# Patient Record
Sex: MALE | Race: ASIAN | Employment: UNEMPLOYED | URBAN - METROPOLITAN AREA
[De-identification: names, ages, dates, MRNs, and addresses within clinical notes are randomized per-mention and may not be internally consistent; named-entity substitution may affect disease eponyms.]

---

## 2017-01-20 RX ORDER — GLIPIZIDE 5 MG/1
TABLET ORAL
Qty: 90 TABLET | Refills: 2 | Status: SHIPPED | OUTPATIENT
Start: 2017-01-20 | End: 2017-05-03

## 2017-02-21 RX ORDER — ASPIRIN 81 MG
TABLET, DELAYED RELEASE (ENTERIC COATED) ORAL
Qty: 90 TABLET | Refills: 3 | Status: SHIPPED | OUTPATIENT
Start: 2017-02-21 | End: 2018-03-12

## 2017-04-02 DIAGNOSIS — E11.65 TYPE 2 DIABETES MELLITUS WITH HYPERGLYCEMIA, WITHOUT LONG-TERM CURRENT USE OF INSULIN (HCC): Chronic | ICD-10-CM

## 2017-04-02 DIAGNOSIS — Z00.00 BLOOD TESTS FOR ROUTINE GENERAL PHYSICAL EXAMINATION: Primary | ICD-10-CM

## 2017-04-02 DIAGNOSIS — E78.00 HYPERCHOLESTEREMIA: Chronic | ICD-10-CM

## 2017-04-02 DIAGNOSIS — I10 ESSENTIAL HYPERTENSION: Chronic | ICD-10-CM

## 2017-04-03 NOTE — TELEPHONE ENCOUNTER
Pt needs metformin and Avivia plus strips    Future Appointments  Date Time Provider Ion Salas   4/11/2017 9:15 AM Anibal Brooks MD [facility] [place]   4/14/2017 10:15 AM Anibal Brooks MD [facility] [place]     Call pt [phone] can leave d

## 2017-04-06 ENCOUNTER — TELEPHONE (OUTPATIENT)
Dept: INTERNAL MEDICINE CLINIC | Facility: CLINIC | Age: 47
End: 2017-04-06

## 2017-04-11 ENCOUNTER — LAB ENCOUNTER (OUTPATIENT)
Dept: LAB | Age: 47
End: 2017-04-11
Attending: INTERNAL MEDICINE
Payer: COMMERCIAL

## 2017-04-11 DIAGNOSIS — Z00.00 BLOOD TESTS FOR ROUTINE GENERAL PHYSICAL EXAMINATION: ICD-10-CM

## 2017-04-11 DIAGNOSIS — E78.00 HYPERCHOLESTEREMIA: Chronic | ICD-10-CM

## 2017-04-11 DIAGNOSIS — E11.65 TYPE 2 DIABETES MELLITUS WITH HYPERGLYCEMIA, WITHOUT LONG-TERM CURRENT USE OF INSULIN (HCC): Chronic | ICD-10-CM

## 2017-04-11 DIAGNOSIS — I10 ESSENTIAL HYPERTENSION: ICD-10-CM

## 2017-04-11 PROCEDURE — 36415 COLL VENOUS BLD VENIPUNCTURE: CPT

## 2017-04-11 PROCEDURE — 83036 HEMOGLOBIN GLYCOSYLATED A1C: CPT

## 2017-04-11 PROCEDURE — 82570 ASSAY OF URINE CREATININE: CPT

## 2017-04-11 PROCEDURE — 82043 UR ALBUMIN QUANTITATIVE: CPT

## 2017-04-11 PROCEDURE — 85025 COMPLETE CBC W/AUTO DIFF WBC: CPT

## 2017-04-11 PROCEDURE — 81001 URINALYSIS AUTO W/SCOPE: CPT

## 2017-04-11 PROCEDURE — 80061 LIPID PANEL: CPT

## 2017-04-11 PROCEDURE — 80053 COMPREHEN METABOLIC PANEL: CPT

## 2017-04-11 PROCEDURE — 84443 ASSAY THYROID STIM HORMONE: CPT

## 2017-04-11 PROCEDURE — 84436 ASSAY OF TOTAL THYROXINE: CPT

## 2017-04-14 ENCOUNTER — OFFICE VISIT (OUTPATIENT)
Dept: INTERNAL MEDICINE CLINIC | Facility: CLINIC | Age: 47
End: 2017-04-14

## 2017-04-14 VITALS
OXYGEN SATURATION: 97 % | HEIGHT: 70 IN | RESPIRATION RATE: 22 BRPM | TEMPERATURE: 99 F | WEIGHT: 247.75 LBS | HEART RATE: 113 BPM | DIASTOLIC BLOOD PRESSURE: 100 MMHG | SYSTOLIC BLOOD PRESSURE: 158 MMHG | BODY MASS INDEX: 35.47 KG/M2

## 2017-04-14 DIAGNOSIS — I10 ESSENTIAL HYPERTENSION: Chronic | ICD-10-CM

## 2017-04-14 DIAGNOSIS — E11.65 TYPE 2 DIABETES MELLITUS WITH HYPERGLYCEMIA, WITHOUT LONG-TERM CURRENT USE OF INSULIN (HCC): Primary | Chronic | ICD-10-CM

## 2017-04-14 PROCEDURE — 99214 OFFICE O/P EST MOD 30 MIN: CPT | Performed by: INTERNAL MEDICINE

## 2017-04-14 RX ORDER — LOSARTAN POTASSIUM 50 MG/1
TABLET ORAL
Refills: 2 | COMMUNITY
Start: 2017-02-21 | End: 2017-04-14

## 2017-04-14 RX ORDER — LOSARTAN POTASSIUM 100 MG/1
100 TABLET ORAL DAILY
Qty: 90 TABLET | Refills: 0 | Status: SHIPPED | OUTPATIENT
Start: 2017-04-14 | End: 2017-09-01

## 2017-04-14 RX ORDER — LATANOPROST 50 UG/ML
SOLUTION/ DROPS OPHTHALMIC
Refills: 1 | COMMUNITY
Start: 2017-03-07 | End: 2018-06-06

## 2017-04-14 NOTE — PROGRESS NOTES
Madeleine Kunz  1970 is a 48year old male. Patient presents with:   Follow - Up: DM     Patient does confess to noncompliance with diet exercise  HPI:   DIABETES MELLITUS:   The diet that the patient has been following is none   The frequency of th Weight loss no. Ophthalmologic:   Change in vision none. Diminished vision no. Double vision no. Vision loss no. Eye check done   Endocrine:   Excessive sweating no. Excessive thirst no. Excessive urination no. Cardiovascular:   Chest pain no.  [employer] Food is an important tool that you can use to control diabetes and stay healthy. Eating well-balanced meals in the correct amounts will help you control your blood glucose levels and prevent low blood sugar reactions.  It will also help you reduce the healt · Avoid added salt. It can contribute to high blood pressure, which can cause heart disease. People with diabetes already have a risk of high blood pressure and heart disease. · Stay at a healthy weight.  If you need to lose weight, cut down on your portio · Select foods from the 6 food groups below. Your dietitian will help you find food choices within each group.  He or she will also show you serving sizes and how many servings you can have at each meal.  ¨ Grains, beans, and starchy vegetables  ¨ Vegetable © 1179-1717 [address], [address] [place]. All rights reserved. This information is not intended as a substitute for professional medical care. Always follow your healthcare professional's instructions.       Patient t

## 2017-04-14 NOTE — PATIENT INSTRUCTIONS
Diet: Diabetes  Food is an important tool that you can use to control diabetes and stay healthy. Eating well-balanced meals in the correct amounts will help you control your blood glucose levels and prevent low blood sugar reactions.  It will also help yo · Avoid added salt. It can contribute to high blood pressure, which can cause heart disease. People with diabetes already have a risk of high blood pressure and heart disease. · Stay at a healthy weight.  If you need to lose weight, cut down on your portio · Select foods from the 6 food groups below. Your dietitian will help you find food choices within each group.  He or she will also show you serving sizes and how many servings you can have at each meal.  ¨ Grains, beans, and starchy vegetables  ¨ Vegetable © 1823-7133 [address], [address] [place]. All rights reserved. This information is not intended as a substitute for professional medical care. Always follow your healthcare professional's instructions.       Patient t

## 2017-04-24 ENCOUNTER — HOSPITAL ENCOUNTER (OUTPATIENT)
Dept: GENERAL RADIOLOGY | Age: 47
Discharge: HOME | End: 2017-04-24
Attending: INTERNAL MEDICINE
Payer: COMMERCIAL

## 2017-04-24 ENCOUNTER — OFFICE VISIT (OUTPATIENT)
Dept: INTERNAL MEDICINE CLINIC | Facility: CLINIC | Age: 47
End: 2017-04-24

## 2017-04-24 VITALS
OXYGEN SATURATION: 97 % | TEMPERATURE: 98 F | SYSTOLIC BLOOD PRESSURE: 130 MMHG | DIASTOLIC BLOOD PRESSURE: 70 MMHG | HEART RATE: 120 BPM | BODY MASS INDEX: 35.43 KG/M2 | RESPIRATION RATE: 16 BRPM | HEIGHT: 70 IN | WEIGHT: 247.5 LBS

## 2017-04-24 DIAGNOSIS — M79.672 LEFT FOOT PAIN: ICD-10-CM

## 2017-04-24 DIAGNOSIS — M79.10 MUSCLE PAIN: Primary | ICD-10-CM

## 2017-04-24 DIAGNOSIS — M79.10 MUSCLE PAIN: ICD-10-CM

## 2017-04-24 DIAGNOSIS — L08.9 SKIN INFECTION: ICD-10-CM

## 2017-04-24 PROCEDURE — 73630 X-RAY EXAM OF FOOT: CPT

## 2017-04-24 PROCEDURE — 99213 OFFICE O/P EST LOW 20 MIN: CPT | Performed by: INTERNAL MEDICINE

## 2017-04-24 PROCEDURE — 71020 XR CHEST PA + LAT CHEST (CPT=71020): CPT

## 2017-04-24 RX ORDER — CYCLOBENZAPRINE HCL 10 MG
10 TABLET ORAL NIGHTLY
Qty: 15 TABLET | Refills: 0 | Status: SHIPPED | OUTPATIENT
Start: 2017-04-24 | End: 2017-05-04

## 2017-04-24 RX ORDER — CLOTRIMAZOLE AND BETAMETHASONE DIPROPIONATE 10; .64 MG/G; MG/G
1 CREAM TOPICAL 2 TIMES DAILY
Qty: 60 G | Refills: 3 | Status: SHIPPED | OUTPATIENT
Start: 2017-04-24 | End: 2017-05-24

## 2017-04-24 NOTE — PROGRESS NOTES
Ellie [facility]aciosedel [facility] [id number] 48year old male. Patient presents with:  Back Pain      HPI:     Chest Pain:   The patient is complaining of chest pain , no symptoms presently . The chest pain began 3 days   The chest pain is described as sharp .    The chest General/Constitutional:   Patient denies fever , chills , night sweats , hemoptysis , weight loss . Respiratory:   Coughing up blood no. Shortness of breath when lying flat no. fever no. Blood-tinged sputum no. Chest congestion none. Cough none.  DO (CPT=47632); Future  -     Diclofenac Sodium 50 MG Oral Tab EC; Take 1 tablet (50 mg total) by mouth 2 (two) times daily. -     Cyclobenzaprine HCl 10 MG Oral Tab; Take 1 tablet (10 mg total) by mouth nightly.     Left foot pain  -     XR FOOT, COMPLETE (M

## 2017-04-25 NOTE — PROGRESS NOTES
Quick Note:    Normal-  X-ray showed no acute changes however stress fracture cannot be ruled out entirely.  Will discuss with patient during follow-up  ______

## 2017-04-27 ENCOUNTER — OFFICE VISIT (OUTPATIENT)
Dept: INTERNAL MEDICINE CLINIC | Facility: CLINIC | Age: 47
End: 2017-04-27

## 2017-04-27 VITALS
WEIGHT: 247.5 LBS | DIASTOLIC BLOOD PRESSURE: 88 MMHG | OXYGEN SATURATION: 96 % | BODY MASS INDEX: 36 KG/M2 | HEART RATE: 111 BPM | RESPIRATION RATE: 16 BRPM | SYSTOLIC BLOOD PRESSURE: 128 MMHG

## 2017-04-27 DIAGNOSIS — R14.0 BLOATING: ICD-10-CM

## 2017-04-27 DIAGNOSIS — E11.65 TYPE 2 DIABETES MELLITUS WITH HYPERGLYCEMIA, WITHOUT LONG-TERM CURRENT USE OF INSULIN (HCC): Chronic | ICD-10-CM

## 2017-04-27 DIAGNOSIS — E78.00 HYPERCHOLESTEREMIA: ICD-10-CM

## 2017-04-27 DIAGNOSIS — Z00.00 ROUTINE GENERAL MEDICAL EXAMINATION AT A HEALTH CARE FACILITY: Primary | ICD-10-CM

## 2017-04-27 DIAGNOSIS — G47.33 OSA (OBSTRUCTIVE SLEEP APNEA): Chronic | ICD-10-CM

## 2017-04-27 PROCEDURE — 93000 ELECTROCARDIOGRAM COMPLETE: CPT | Performed by: INTERNAL MEDICINE

## 2017-04-27 PROCEDURE — 99396 PREV VISIT EST AGE 40-64: CPT | Performed by: INTERNAL MEDICINE

## 2017-04-27 RX ORDER — ATORVASTATIN CALCIUM 20 MG/1
TABLET, FILM COATED ORAL
Qty: 90 TABLET | Refills: 3 | Status: SHIPPED | OUTPATIENT
Start: 2017-04-27 | End: 2018-04-16

## 2017-04-27 NOTE — PROGRESS NOTES
Miguel Frazier  1970 is a 48year old male.   Patient presents with:  Physical      HPI:   Lab discussion and complete physical    Current Outpatient Prescriptions:  Atorvastatin Calcium 20 MG Oral Tab Hold for 4 weeks Disp: 90 tablet Rfl: 3   Diclofen sore throats, no hoarseness. Neck no lumps, no goiter, no neck stiffness or pain. Endocrine:   Diabetes of 5 years duration .   Has had no formal training in diet and sugar monitoring- thyroid disorder none.    Respiratory:   Patient denies chest pain, co however does have episodes of snoring-see sleep questionnaire. Memory loss none. EXAM:   /88 mmHg  Pulse 111  Resp 16  Wt 247 lb 8 oz  SpO2 96%  GENERAL:   Build: average .    HEENT:   Ear canals: normal.   Hearing assessed yes  EOM: within normal l Supraclavicular: none.    DERMATOLOGY:   Rash: no.       ASSESSMENT AND PLAN:   Kristi Gale was seen today for physical.    Diagnoses and all orders for this visit:    Routine general medical examination at a health care facility    Hypercholesteremia  -     At

## 2017-05-04 RX ORDER — GLIPIZIDE 5 MG/1
TABLET ORAL
Qty: 90 TABLET | Refills: 0 | Status: SHIPPED | OUTPATIENT
Start: 2017-05-04 | End: 2017-06-14

## 2017-06-15 RX ORDER — GLIPIZIDE 5 MG/1
TABLET ORAL
Qty: 90 TABLET | Refills: 0 | Status: SHIPPED | OUTPATIENT
Start: 2017-06-15 | End: 2017-07-27

## 2017-07-12 ENCOUNTER — TELEPHONE (OUTPATIENT)
Dept: INTERNAL MEDICINE CLINIC | Facility: CLINIC | Age: 47
End: 2017-07-12

## 2017-07-12 DIAGNOSIS — E11.65 TYPE 2 DIABETES MELLITUS WITH HYPERGLYCEMIA, WITHOUT LONG-TERM CURRENT USE OF INSULIN (HCC): Primary | Chronic | ICD-10-CM

## 2017-07-12 NOTE — TELEPHONE ENCOUNTER
Referral entered and approved through pts insurance company in [address] [place] [address].

## 2017-07-27 RX ORDER — GLIPIZIDE 5 MG/1
TABLET ORAL
Qty: 90 TABLET | Refills: 0 | Status: SHIPPED | OUTPATIENT
Start: 2017-07-27 | End: 2017-09-13

## 2017-07-27 NOTE — TELEPHONE ENCOUNTER
Pt does not meet protocol  due to Last A1c. Medication pending, ok to refill?    Ref Range & Units 4/11/17  9:47 AM   HgbA1C <5.7 % 9.7

## 2017-09-01 DIAGNOSIS — I10 ESSENTIAL HYPERTENSION: Chronic | ICD-10-CM

## 2017-09-05 RX ORDER — LOSARTAN POTASSIUM 100 MG/1
TABLET ORAL
Qty: 90 TABLET | Refills: 0 | Status: SHIPPED | OUTPATIENT
Start: 2017-09-05 | End: 2017-12-11

## 2017-09-13 RX ORDER — GLIPIZIDE 5 MG/1
TABLET ORAL
Qty: 90 TABLET | Refills: 0 | Status: SHIPPED | OUTPATIENT
Start: 2017-09-13 | End: 2017-10-06

## 2017-09-13 NOTE — TELEPHONE ENCOUNTER
Request for medication GLIPIZIDE 5 MG sent to [place] [id number] [address] [facility], [address] [place] [address] Drive AT . [address], [phone], [phone]

## 2017-09-13 NOTE — TELEPHONE ENCOUNTER
Pt does not meet refill protocol for glipizide since   4/11/17  9:47 AM     HgbA1C <5.7 % 9.7      Medication pending, ok to refill?

## 2017-10-07 RX ORDER — GLIPIZIDE 5 MG/1
TABLET ORAL
Qty: 90 TABLET | Refills: 0 | Status: SHIPPED | OUTPATIENT
Start: 2017-10-07 | End: 2017-11-24

## 2017-11-03 ENCOUNTER — TELEPHONE (OUTPATIENT)
Dept: INTERNAL MEDICINE CLINIC | Facility: CLINIC | Age: 47
End: 2017-11-03

## 2017-11-24 ENCOUNTER — TELEPHONE (OUTPATIENT)
Dept: INTERNAL MEDICINE CLINIC | Facility: CLINIC | Age: 47
End: 2017-11-24

## 2017-11-24 RX ORDER — GLIPIZIDE 5 MG/1
TABLET ORAL
Qty: 90 TABLET | Refills: 0 | OUTPATIENT
Start: 2017-11-24

## 2017-11-24 RX ORDER — GLIPIZIDE 5 MG/1
TABLET ORAL
Qty: 90 TABLET | Refills: 0 | Status: SHIPPED | OUTPATIENT
Start: 2017-11-24 | End: 2017-12-11

## 2017-12-10 DIAGNOSIS — I10 ESSENTIAL HYPERTENSION: Chronic | ICD-10-CM

## 2017-12-10 RX ORDER — LOSARTAN POTASSIUM 100 MG/1
TABLET ORAL
Qty: 90 TABLET | Refills: 0 | Status: CANCELLED | OUTPATIENT
Start: 2017-12-10

## 2017-12-11 ENCOUNTER — OFFICE VISIT (OUTPATIENT)
Dept: INTERNAL MEDICINE CLINIC | Facility: CLINIC | Age: 47
End: 2017-12-11

## 2017-12-11 ENCOUNTER — TELEPHONE (OUTPATIENT)
Dept: INTERNAL MEDICINE CLINIC | Facility: CLINIC | Age: 47
End: 2017-12-11

## 2017-12-11 VITALS
BODY MASS INDEX: 34.3 KG/M2 | SYSTOLIC BLOOD PRESSURE: 136 MMHG | DIASTOLIC BLOOD PRESSURE: 100 MMHG | HEART RATE: 78 BPM | HEIGHT: 71 IN | TEMPERATURE: 98 F | RESPIRATION RATE: 16 BRPM | WEIGHT: 245 LBS

## 2017-12-11 DIAGNOSIS — E78.00 HYPERCHOLESTEREMIA: Chronic | ICD-10-CM

## 2017-12-11 DIAGNOSIS — E11.65 TYPE 2 DIABETES MELLITUS WITH HYPERGLYCEMIA, WITHOUT LONG-TERM CURRENT USE OF INSULIN (HCC): Chronic | ICD-10-CM

## 2017-12-11 DIAGNOSIS — I10 ESSENTIAL HYPERTENSION: Chronic | ICD-10-CM

## 2017-12-11 DIAGNOSIS — R05.9 COUGH: Primary | ICD-10-CM

## 2017-12-11 PROCEDURE — 99214 OFFICE O/P EST MOD 30 MIN: CPT | Performed by: INTERNAL MEDICINE

## 2017-12-11 RX ORDER — CROMOLYN SODIUM 5.2 MG
1 AEROSOL, SPRAY WITH PUMP (ML) NASAL 4 TIMES DAILY PRN
Qty: 13 ML | Refills: 1 | COMMUNITY
Start: 2017-12-11 | End: 2019-09-18 | Stop reason: ALTCHOICE

## 2017-12-11 RX ORDER — CODEINE PHOSPHATE AND GUAIFENESIN 10; 100 MG/5ML; MG/5ML
5 SOLUTION ORAL EVERY 6 HOURS PRN
Qty: 240 ML | Refills: 0 | Status: SHIPPED | OUTPATIENT
Start: 2017-12-11 | End: 2017-12-21

## 2017-12-11 RX ORDER — AMOXICILLIN AND CLAVULANATE POTASSIUM 500; 125 MG/1; MG/1
1 TABLET, FILM COATED ORAL 2 TIMES DAILY
Qty: 20 TABLET | Refills: 0 | Status: SHIPPED | OUTPATIENT
Start: 2017-12-11 | End: 2017-12-21

## 2017-12-11 RX ORDER — LEVOFLOXACIN 500 MG/1
500 TABLET, FILM COATED ORAL DAILY
Qty: 10 TABLET | Refills: 0 | Status: SHIPPED | OUTPATIENT
Start: 2017-12-11 | End: 2017-12-11 | Stop reason: ALTCHOICE

## 2017-12-11 RX ORDER — GLIPIZIDE 5 MG/1
TABLET ORAL
Qty: 90 TABLET | Refills: 0 | COMMUNITY
Start: 2017-12-11 | End: 2017-12-22

## 2017-12-11 RX ORDER — LOSARTAN POTASSIUM 100 MG/1
TABLET ORAL
Qty: 90 TABLET | Refills: 0 | Status: SHIPPED | OUTPATIENT
Start: 2017-12-11 | End: 2018-03-12

## 2017-12-11 NOTE — PATIENT INSTRUCTIONS
Ck sugar # pre and post meals ,compliance with diet/exercise enforce.  Weight counseling discussed   Spacing of meals -varying exercises discussed with patient   Reasoning of checking sugars pre and 2 hours  PP discussed with pt     For blood work third wee

## 2017-12-11 NOTE — PROGRESS NOTES
Edward Cuevas  1970 is a 48year old male who presents for upper respiratory symptoms    Patient presents with:  Cough        HPI:   Pt reports  respiratory symptoms for few days. Cough with yellowish expectoration.   sugar numbers have been running headaches    EXAM:   /100   Pulse 78   Temp 97.7 °F (36.5 °C) (Oral)   Resp 16   Ht 71\"   Wt 245 lb   BMI 34.17 kg/m²   GENERAL: well developed, well nourished,in no apparent distress  SKIN: no rashes,no suspicious lesions  EYES:PERRLA, EOMI intact,

## 2017-12-11 NOTE — TELEPHONE ENCOUNTER
Romel called stating the perscription glipizide interacts with levofloxacin. Needs instruction whether to hold off on filling one of the perscriptions.

## 2017-12-21 RX ORDER — GLIPIZIDE 5 MG/1
TABLET ORAL
Qty: 90 TABLET | Refills: 0 | Status: SHIPPED | OUTPATIENT
Start: 2017-12-21 | End: 2017-12-21

## 2017-12-22 RX ORDER — GLIPIZIDE 5 MG/1
TABLET ORAL
Qty: 270 TABLET | Refills: 0 | Status: SHIPPED | OUTPATIENT
Start: 2017-12-22 | End: 2018-09-13

## 2018-01-31 RX ORDER — GLIPIZIDE 5 MG/1
TABLET ORAL
Qty: 90 TABLET | Refills: 0 | Status: SHIPPED | OUTPATIENT
Start: 2018-01-31 | End: 2018-03-12

## 2018-03-12 DIAGNOSIS — I10 ESSENTIAL HYPERTENSION: Chronic | ICD-10-CM

## 2018-03-13 RX ORDER — LOSARTAN POTASSIUM 100 MG/1
TABLET ORAL
Qty: 90 TABLET | Refills: 0 | Status: SHIPPED | OUTPATIENT
Start: 2018-03-13 | End: 2018-06-10

## 2018-03-15 ENCOUNTER — TELEPHONE (OUTPATIENT)
Dept: INTERNAL MEDICINE CLINIC | Facility: CLINIC | Age: 48
End: 2018-03-15

## 2018-03-15 DIAGNOSIS — E11.65 TYPE 2 DIABETES MELLITUS WITH HYPERGLYCEMIA, WITHOUT LONG-TERM CURRENT USE OF INSULIN (HCC): Primary | Chronic | ICD-10-CM

## 2018-03-15 RX ORDER — GLIPIZIDE 5 MG/1
TABLET ORAL
Qty: 90 TABLET | Refills: 0 | Status: SHIPPED | OUTPATIENT
Start: 2018-03-15 | End: 2018-04-16

## 2018-03-15 RX ORDER — ASPIRIN 81 MG
TABLET, DELAYED RELEASE (ENTERIC COATED) ORAL
Qty: 90 TABLET | Refills: 0 | Status: SHIPPED | OUTPATIENT
Start: 2018-03-15 | End: 2018-06-15

## 2018-03-15 NOTE — TELEPHONE ENCOUNTER
Reason for the order/referral:Yearly   PCP: Romina Dukes   Refer to Provider: Jerry Canales   Specialty:Ophthalmology   Patient Insurance: Payor: Jonatan Romero / Plan: Bette Rader / Product Type: HMO /   Has the patient been seen by their PCP for this condition: Y

## 2018-03-16 NOTE — TELEPHONE ENCOUNTER
Referral entered and approved through pts insurance company in [address] [place] [address]. [facility] notified.

## 2018-03-28 ENCOUNTER — TELEPHONE (OUTPATIENT)
Dept: INTERNAL MEDICINE CLINIC | Facility: CLINIC | Age: 48
End: 2018-03-28

## 2018-04-04 DIAGNOSIS — E11.65 TYPE 2 DIABETES MELLITUS WITH HYPERGLYCEMIA, WITHOUT LONG-TERM CURRENT USE OF INSULIN (HCC): Chronic | ICD-10-CM

## 2018-04-14 ENCOUNTER — APPOINTMENT (OUTPATIENT)
Dept: LAB | Age: 48
End: 2018-04-14
Attending: INTERNAL MEDICINE
Payer: COMMERCIAL

## 2018-04-14 DIAGNOSIS — E11.65 TYPE 2 DIABETES MELLITUS WITH HYPERGLYCEMIA, WITHOUT LONG-TERM CURRENT USE OF INSULIN (HCC): Chronic | ICD-10-CM

## 2018-04-14 DIAGNOSIS — E78.00 HYPERCHOLESTEREMIA: Chronic | ICD-10-CM

## 2018-04-14 PROCEDURE — 83036 HEMOGLOBIN GLYCOSYLATED A1C: CPT

## 2018-04-14 PROCEDURE — 80053 COMPREHEN METABOLIC PANEL: CPT

## 2018-04-14 PROCEDURE — 80061 LIPID PANEL: CPT

## 2018-04-14 PROCEDURE — 36415 COLL VENOUS BLD VENIPUNCTURE: CPT

## 2018-04-16 ENCOUNTER — OFFICE VISIT (OUTPATIENT)
Dept: INTERNAL MEDICINE CLINIC | Facility: CLINIC | Age: 48
End: 2018-04-16

## 2018-04-16 VITALS
WEIGHT: 246 LBS | HEART RATE: 102 BPM | DIASTOLIC BLOOD PRESSURE: 94 MMHG | RESPIRATION RATE: 16 BRPM | BODY MASS INDEX: 34 KG/M2 | OXYGEN SATURATION: 99 % | SYSTOLIC BLOOD PRESSURE: 140 MMHG

## 2018-04-16 DIAGNOSIS — I10 ESSENTIAL HYPERTENSION: Primary | Chronic | ICD-10-CM

## 2018-04-16 DIAGNOSIS — E78.00 HYPERCHOLESTEREMIA: Chronic | ICD-10-CM

## 2018-04-16 DIAGNOSIS — E11.65 TYPE 2 DIABETES MELLITUS WITH HYPERGLYCEMIA, WITHOUT LONG-TERM CURRENT USE OF INSULIN (HCC): Chronic | ICD-10-CM

## 2018-04-16 PROCEDURE — 99213 OFFICE O/P EST LOW 20 MIN: CPT | Performed by: INTERNAL MEDICINE

## 2018-04-16 RX ORDER — ATORVASTATIN CALCIUM 20 MG/1
20 TABLET, FILM COATED ORAL NIGHTLY
Qty: 90 TABLET | Refills: 3 | Status: SHIPPED | OUTPATIENT
Start: 2018-04-16 | End: 2018-09-13

## 2018-04-16 NOTE — PROGRESS NOTES
Joe Yanes  1970 is a 50year old male. Patient presents with: Follow - Up      HPI:   DIABETES MELLITUS:   The diet that the patient has been following is the none  The frequency of the monitoring schedule is occasionally.      The results of Weight loss no. Ophthalmologic:   Change in vision none. Diminished vision no. Double vision no. Vision loss no. Eye check done   Endocrine:   Excessive sweating no. Excessive thirst no. Excessive urination no. Cardiovascular:   Chest pain no.  King Render the need to exercise       The patient indicates understanding of these issues and agrees to the plan. The patient is asked to Return in about 6 weeks (around 5/28/2018). .  Ck sugar # pre and post meals ,compliance with diet/exercise enforce.  Weight cou

## 2018-04-20 RX ORDER — GLIPIZIDE 5 MG/1
TABLET ORAL
Qty: 90 TABLET | Refills: 0 | Status: SHIPPED | OUTPATIENT
Start: 2018-04-20 | End: 2018-05-28

## 2018-04-20 NOTE — TELEPHONE ENCOUNTER
Patient called to follow up on refill request. Patient states he will be going out of town tonight and needs his medication

## 2018-05-30 NOTE — TELEPHONE ENCOUNTER
Glipizide 5 mg 1.5 tab bid filled 4/20/18 90 with 0 refills     LOV 4/16/18    No upcoming apt on file     Labs 4/14/18    HgbA1C <5.7 % 9.5

## 2018-05-31 RX ORDER — GLIPIZIDE 5 MG/1
TABLET ORAL
Qty: 135 TABLET | Refills: 0 | Status: SHIPPED | OUTPATIENT
Start: 2018-05-31 | End: 2018-05-31

## 2018-06-01 RX ORDER — GLIPIZIDE 5 MG/1
TABLET ORAL
Qty: 270 TABLET | Refills: 0 | Status: SHIPPED | OUTPATIENT
Start: 2018-06-01 | End: 2018-06-06 | Stop reason: ALTCHOICE

## 2018-06-06 ENCOUNTER — OFFICE VISIT (OUTPATIENT)
Dept: INTERNAL MEDICINE CLINIC | Facility: CLINIC | Age: 48
End: 2018-06-06

## 2018-06-06 VITALS
DIASTOLIC BLOOD PRESSURE: 88 MMHG | OXYGEN SATURATION: 95 % | HEART RATE: 104 BPM | TEMPERATURE: 99 F | WEIGHT: 245 LBS | HEIGHT: 69.75 IN | BODY MASS INDEX: 35.47 KG/M2 | RESPIRATION RATE: 18 BRPM | SYSTOLIC BLOOD PRESSURE: 120 MMHG

## 2018-06-06 DIAGNOSIS — I10 ESSENTIAL HYPERTENSION: Chronic | ICD-10-CM

## 2018-06-06 DIAGNOSIS — J06.9 ACUTE UPPER RESPIRATORY INFECTION: Primary | ICD-10-CM

## 2018-06-06 PROCEDURE — 99213 OFFICE O/P EST LOW 20 MIN: CPT | Performed by: INTERNAL MEDICINE

## 2018-06-06 RX ORDER — LATANOPROST 50 UG/ML
1 SOLUTION/ DROPS OPHTHALMIC NIGHTLY
COMMUNITY

## 2018-06-06 RX ORDER — FLUTICASONE PROPIONATE 50 MCG
2 SPRAY, SUSPENSION (ML) NASAL DAILY
Qty: 1 BOTTLE | Refills: 3 | Status: SHIPPED | OUTPATIENT
Start: 2018-06-06

## 2018-06-06 RX ORDER — AMOXICILLIN AND CLAVULANATE POTASSIUM 875; 125 MG/1; MG/1
1 TABLET, FILM COATED ORAL 2 TIMES DAILY
Qty: 14 TABLET | Refills: 0 | Status: SHIPPED | OUTPATIENT
Start: 2018-06-06 | End: 2018-06-13

## 2018-06-06 NOTE — PROGRESS NOTES
Artur Gutierrez is a 50year old male. HPI:   Patient presents with:  Nasal Congestion  Cough  Body ache and/or chills  Other: Due for CPE/ Foot Exam  - advised to schedule with Dr Lynn Sauer  Patient presents with acute upper respiratory symptoms.   Symptoms sta 4 (four) times daily. , Disp: 13 mL, Rfl: 1  •  Omeprazole 40 MG Oral Capsule Delayed Release, Take 40 mg by mouth daily as needed. , Disp: , Rfl:   Medical:  has a past medical history of Diabetes ([facility] [address] Esophageal reflux; and Hyperlipidemia.   Surgical:

## 2018-06-06 NOTE — PATIENT INSTRUCTIONS
- Start antibiotics (Augmentin). Take twice daily, with food if possible  - Also use steroid nasal sprays such as fluticasone, mometasone, Flonase, Nasocort, Nasonex, Rhinocort. Use twice daily for next week.     - For the stomach issues, take omeprazole

## 2018-06-10 DIAGNOSIS — I10 ESSENTIAL HYPERTENSION: Chronic | ICD-10-CM

## 2018-06-11 RX ORDER — LOSARTAN POTASSIUM 100 MG/1
TABLET ORAL
Qty: 90 TABLET | Refills: 0 | Status: SHIPPED | OUTPATIENT
Start: 2018-06-11 | End: 2018-09-15

## 2018-06-15 RX ORDER — ASPIRIN 81 MG/1
81 TABLET ORAL DAILY
Qty: 30 TABLET | Refills: 0 | Status: SHIPPED | OUTPATIENT
Start: 2018-06-15 | End: 2018-07-30

## 2018-06-15 NOTE — TELEPHONE ENCOUNTER
Refill requested: Aspirin 81 mg     Failed protocol - no protocol       Last refill: 3/15/18 #90 NR   Relevant Labs: NA  Last OV / RTC advised: 6/6/18 Dr Jyotsna Sanchez RTC with Dr Sabina Dave. 4/16/18 Dr Sabina Dave RTC in 6 weeks    Appt Scheduled:  No

## 2018-06-29 DIAGNOSIS — E11.65 TYPE 2 DIABETES MELLITUS WITH HYPERGLYCEMIA, WITHOUT LONG-TERM CURRENT USE OF INSULIN (HCC): Chronic | ICD-10-CM

## 2018-06-29 NOTE — TELEPHONE ENCOUNTER
Refill requested: Metformin 1000     Failed protocol      Last refill: 4/4/18 #180 NR      Relevant Labs: HA1C 4/14/18     Last OV / RTC advised: 6/6/18 Dr Nanette Garner RTC ASAP with Dr Chaparro Livingston Scheduled:  No

## 2018-07-14 DIAGNOSIS — E11.65 TYPE 2 DIABETES MELLITUS WITH HYPERGLYCEMIA, WITHOUT LONG-TERM CURRENT USE OF INSULIN (HCC): Chronic | ICD-10-CM

## 2018-07-16 RX ORDER — SITAGLIPTIN 100 MG/1
TABLET, FILM COATED ORAL
Qty: 30 TABLET | Refills: 0 | Status: SHIPPED | OUTPATIENT
Start: 2018-07-16 | End: 2018-08-19

## 2018-07-16 RX ORDER — GLIPIZIDE 5 MG/1
TABLET ORAL
Qty: 135 TABLET | Refills: 0 | Status: SHIPPED | OUTPATIENT
Start: 2018-07-16 | End: 2018-09-15

## 2018-07-16 NOTE — TELEPHONE ENCOUNTER
Diabetic Protocol Failed    Medication(s) to Refill:   Pending Prescriptions Disp Refills    JANUVIA 100 MG Oral Tab [Pharmacy Med Name: JANUVIA 100MG TABLETS] 30 tablet 0     Sig: TAKE 1 TABLET(100 MG) BY MOUTH DAILY      GLIPIZIDE 5 MG Oral Tab [Pharmacy

## 2018-07-31 RX ORDER — ASPIRIN 81 MG/1
81 TABLET ORAL DAILY
Qty: 90 TABLET | Refills: 0 | Status: SHIPPED | OUTPATIENT
Start: 2018-07-31 | End: 2018-10-30

## 2018-07-31 NOTE — TELEPHONE ENCOUNTER
Refill requested: Aspirin 81 mg     Failed protocol      Last refill: 6/15/18 #30 NR    Relevant Labs: NA  Last OV / RTC advised: 6/6/18 Dr Ashley Miles  RTC not on file          4/16/18 Dr Ariella Singh RTC 6 weeks    Appt Scheduled:  No

## 2018-08-19 DIAGNOSIS — E11.65 TYPE 2 DIABETES MELLITUS WITH HYPERGLYCEMIA, WITHOUT LONG-TERM CURRENT USE OF INSULIN (HCC): Chronic | ICD-10-CM

## 2018-08-21 RX ORDER — SITAGLIPTIN 100 MG/1
TABLET, FILM COATED ORAL
Qty: 30 TABLET | Refills: 0 | Status: SHIPPED | OUTPATIENT
Start: 2018-08-21 | End: 2018-09-17

## 2018-08-21 NOTE — TELEPHONE ENCOUNTER
2nd attempt to reach patient to schedule a f/u - LVM for patient to call.       Medication(s) to Refill:   Pending Prescriptions Disp Refills    JANUVIA 100 MG Oral Tab [Pharmacy Med Name: JANUVIA 100MG TABLETS] 30 tablet 0     Sig: TAKE 1 TABLET(100 MG) BY

## 2018-09-06 ENCOUNTER — TELEPHONE (OUTPATIENT)
Dept: INTERNAL MEDICINE CLINIC | Facility: CLINIC | Age: 48
End: 2018-09-06

## 2018-09-06 NOTE — TELEPHONE ENCOUNTER
Patient called to request lab orders be entered by doctor so he can go to different [facility] on Saturday in [place] to get drawn 9/8/18 advised to give 24 hours for doctor to get into system

## 2018-09-08 ENCOUNTER — LABORATORY ENCOUNTER (OUTPATIENT)
Dept: LAB | Age: 48
End: 2018-09-08
Attending: INTERNAL MEDICINE
Payer: COMMERCIAL

## 2018-09-08 ENCOUNTER — TELEPHONE (OUTPATIENT)
Dept: INTERNAL MEDICINE CLINIC | Facility: CLINIC | Age: 48
End: 2018-09-08

## 2018-09-08 DIAGNOSIS — E11.65 TYPE 2 DIABETES MELLITUS WITH HYPERGLYCEMIA, WITHOUT LONG-TERM CURRENT USE OF INSULIN (HCC): Chronic | ICD-10-CM

## 2018-09-08 DIAGNOSIS — Z00.00 ROUTINE GENERAL MEDICAL EXAMINATION AT A HEALTH CARE FACILITY: ICD-10-CM

## 2018-09-08 DIAGNOSIS — Z00.00 ROUTINE GENERAL MEDICAL EXAMINATION AT A HEALTH CARE FACILITY: Primary | ICD-10-CM

## 2018-09-08 DIAGNOSIS — E11.65 TYPE 2 DIABETES MELLITUS WITH HYPERGLYCEMIA, WITHOUT LONG-TERM CURRENT USE OF INSULIN (HCC): ICD-10-CM

## 2018-09-08 LAB
ALBUMIN SERPL-MCNC: 3.8 G/DL (ref 3.5–4.8)
ALBUMIN/GLOB SERPL: 1 {RATIO} (ref 1–2)
ALP LIVER SERPL-CCNC: 101 U/L (ref 45–117)
ALT SERPL-CCNC: 39 U/L (ref 17–63)
ANION GAP SERPL CALC-SCNC: 7 MMOL/L (ref 0–18)
AST SERPL-CCNC: 18 U/L (ref 15–41)
BASOPHILS # BLD AUTO: 0.05 X10(3) UL (ref 0–0.1)
BASOPHILS NFR BLD AUTO: 0.8 %
BILIRUB SERPL-MCNC: 0.6 MG/DL (ref 0.1–2)
BILIRUB UR QL STRIP.AUTO: NEGATIVE
BUN BLD-MCNC: 13 MG/DL (ref 8–20)
BUN/CREAT SERPL: 12 (ref 10–20)
CALCIUM BLD-MCNC: 9.2 MG/DL (ref 8.3–10.3)
CHLORIDE SERPL-SCNC: 99 MMOL/L (ref 101–111)
CHOLEST SMN-MCNC: 224 MG/DL (ref ?–200)
CLARITY UR REFRACT.AUTO: CLEAR
CO2 SERPL-SCNC: 27 MMOL/L (ref 22–32)
COLOR UR AUTO: YELLOW
CREAT BLD-MCNC: 1.08 MG/DL (ref 0.7–1.3)
CREAT UR-SCNC: 136 MG/DL
EOSINOPHIL # BLD AUTO: 0.27 X10(3) UL (ref 0–0.3)
EOSINOPHIL NFR BLD AUTO: 4.3 %
ERYTHROCYTE [DISTWIDTH] IN BLOOD BY AUTOMATED COUNT: 13.5 % (ref 11.5–16)
EST. AVERAGE GLUCOSE BLD GHB EST-MCNC: 237 MG/DL (ref 68–126)
GLOBULIN PLAS-MCNC: 3.9 G/DL (ref 2.5–4)
GLUCOSE BLD-MCNC: 228 MG/DL (ref 70–99)
GLUCOSE UR STRIP.AUTO-MCNC: >=500 MG/DL
HBA1C MFR BLD HPLC: 9.9 % (ref ?–5.7)
HCT VFR BLD AUTO: 48.6 % (ref 37–53)
HDLC SERPL-MCNC: 37 MG/DL (ref 40–59)
HGB BLD-MCNC: 16.2 G/DL (ref 13–17)
IMMATURE GRANULOCYTE COUNT: 0.03 X10(3) UL (ref 0–1)
IMMATURE GRANULOCYTE RATIO %: 0.5 %
KETONES UR STRIP.AUTO-MCNC: NEGATIVE MG/DL
LDLC SERPL CALC-MCNC: 154 MG/DL (ref ?–100)
LEUKOCYTE ESTERASE UR QL STRIP.AUTO: NEGATIVE
LYMPHOCYTES # BLD AUTO: 2.16 X10(3) UL (ref 0.9–4)
LYMPHOCYTES NFR BLD AUTO: 34.2 %
M PROTEIN MFR SERPL ELPH: 7.7 G/DL (ref 6.1–8.3)
MCH RBC QN AUTO: 27.8 PG (ref 27–33.2)
MCHC RBC AUTO-ENTMCNC: 33.3 G/DL (ref 31–37)
MCV RBC AUTO: 83.4 FL (ref 80–99)
MICROALBUMIN UR-MCNC: 79.8 MG/DL
MICROALBUMIN/CREAT 24H UR-RTO: 586.8 UG/MG (ref ?–30)
MONOCYTES # BLD AUTO: 0.5 X10(3) UL (ref 0.1–1)
MONOCYTES NFR BLD AUTO: 7.9 %
NEUTROPHIL ABS PRELIM: 3.3 X10 (3) UL (ref 1.3–6.7)
NEUTROPHILS # BLD AUTO: 3.3 X10(3) UL (ref 1.3–6.7)
NEUTROPHILS NFR BLD AUTO: 52.3 %
NITRITE UR QL STRIP.AUTO: NEGATIVE
NONHDLC SERPL-MCNC: 187 MG/DL (ref ?–130)
OSMOLALITY SERPL CALC.SUM OF ELEC: 283 MOSM/KG (ref 275–295)
PH UR STRIP.AUTO: 5 [PH] (ref 4.5–8)
PLATELET # BLD AUTO: 198 10(3)UL (ref 150–450)
POTASSIUM SERPL-SCNC: 4.4 MMOL/L (ref 3.6–5.1)
PROT UR STRIP.AUTO-MCNC: 100 MG/DL
PSA SERPL-MCNC: 0.3 NG/ML (ref 0.01–4)
RBC # BLD AUTO: 5.83 X10(6)UL (ref 4.3–5.7)
RBC UR QL AUTO: NEGATIVE
RED CELL DISTRIBUTION WIDTH-SD: 40.2 FL (ref 35.1–46.3)
SODIUM SERPL-SCNC: 133 MMOL/L (ref 136–144)
SP GR UR STRIP.AUTO: 1.02 (ref 1–1.03)
T4 SERPL-MCNC: 7.8 UG/DL (ref 4.5–10.9)
TRIGL SERPL-MCNC: 164 MG/DL (ref 30–149)
TSI SER-ACNC: 3.51 MIU/ML (ref 0.35–5.5)
UROBILINOGEN UR STRIP.AUTO-MCNC: <2 MG/DL
VLDLC SERPL CALC-MCNC: 33 MG/DL (ref 0–30)
WBC # BLD AUTO: 6.3 X10(3) UL (ref 4–13)

## 2018-09-08 PROCEDURE — 84436 ASSAY OF TOTAL THYROXINE: CPT

## 2018-09-08 PROCEDURE — 84443 ASSAY THYROID STIM HORMONE: CPT

## 2018-09-08 PROCEDURE — 80061 LIPID PANEL: CPT

## 2018-09-08 PROCEDURE — 84153 ASSAY OF PSA TOTAL: CPT

## 2018-09-08 PROCEDURE — 80053 COMPREHEN METABOLIC PANEL: CPT

## 2018-09-08 PROCEDURE — 83036 HEMOGLOBIN GLYCOSYLATED A1C: CPT

## 2018-09-08 PROCEDURE — 85025 COMPLETE CBC W/AUTO DIFF WBC: CPT

## 2018-09-08 PROCEDURE — 82043 UR ALBUMIN QUANTITATIVE: CPT

## 2018-09-08 PROCEDURE — 82570 ASSAY OF URINE CREATININE: CPT

## 2018-09-08 PROCEDURE — 81001 URINALYSIS AUTO W/SCOPE: CPT

## 2018-09-08 PROCEDURE — 36415 COLL VENOUS BLD VENIPUNCTURE: CPT

## 2018-09-13 ENCOUNTER — OFFICE VISIT (OUTPATIENT)
Dept: INTERNAL MEDICINE CLINIC | Facility: CLINIC | Age: 48
End: 2018-09-13

## 2018-09-13 VITALS
BODY MASS INDEX: 36 KG/M2 | WEIGHT: 251.25 LBS | RESPIRATION RATE: 20 BRPM | DIASTOLIC BLOOD PRESSURE: 100 MMHG | TEMPERATURE: 98 F | OXYGEN SATURATION: 99 % | HEART RATE: 114 BPM | SYSTOLIC BLOOD PRESSURE: 130 MMHG

## 2018-09-13 DIAGNOSIS — E78.2 MIXED HYPERLIPIDEMIA: ICD-10-CM

## 2018-09-13 DIAGNOSIS — E78.00 HYPERCHOLESTEREMIA: Chronic | ICD-10-CM

## 2018-09-13 DIAGNOSIS — E11.65 TYPE 2 DIABETES MELLITUS WITH HYPERGLYCEMIA, WITHOUT LONG-TERM CURRENT USE OF INSULIN (HCC): Chronic | ICD-10-CM

## 2018-09-13 DIAGNOSIS — G89.29 CHRONIC RIGHT SHOULDER PAIN: Primary | ICD-10-CM

## 2018-09-13 DIAGNOSIS — M25.511 CHRONIC RIGHT SHOULDER PAIN: Primary | ICD-10-CM

## 2018-09-13 PROCEDURE — 99214 OFFICE O/P EST MOD 30 MIN: CPT | Performed by: INTERNAL MEDICINE

## 2018-09-13 RX ORDER — ATORVASTATIN CALCIUM 20 MG/1
20 TABLET, FILM COATED ORAL NIGHTLY
Qty: 90 TABLET | Refills: 3 | Status: SHIPPED | OUTPATIENT
Start: 2018-09-13 | End: 2019-09-18

## 2018-09-13 NOTE — PROGRESS NOTES
Shilpi Sanchez  1970 is a 50year old male. Patient presents with: Follow - Up      HPI:   Shoulder/Upper arm:   Shoulder pain   Right   Fall none. Direct trauma none   Previous injury none. Tingling/numbness none. Weakness none.  S  Previous Shoulder / Upper arm:   SHOULDER: right    INSPECTION: muscle atrophy none. RANGE OF MOTION   loss of active greater than passive range of motion. PALPATION: No pain with palpation over greater tuberousity and subacromial bursa.    IMPINGEMENT SIGN:

## 2018-09-13 NOTE — PATIENT INSTRUCTIONS
Ck sugar # pre and post meals ,compliance with diet/exercise enforce.  Weight counseling discussed   Spacing of meals -varying exercises discussed with patient   Reasoning of checking sugars pre and 2 hours  PP discussed with pt     HTN  Monitor blood press

## 2018-09-15 DIAGNOSIS — I10 ESSENTIAL HYPERTENSION: Chronic | ICD-10-CM

## 2018-09-17 DIAGNOSIS — E11.65 TYPE 2 DIABETES MELLITUS WITH HYPERGLYCEMIA, WITHOUT LONG-TERM CURRENT USE OF INSULIN (HCC): Chronic | ICD-10-CM

## 2018-09-17 RX ORDER — LOSARTAN POTASSIUM 100 MG/1
TABLET ORAL
Qty: 90 TABLET | Refills: 0 | Status: SHIPPED | OUTPATIENT
Start: 2018-09-17 | End: 2018-12-17

## 2018-09-17 RX ORDER — GLIPIZIDE 5 MG/1
TABLET ORAL
Qty: 135 TABLET | Refills: 0 | Status: SHIPPED | OUTPATIENT
Start: 2018-09-17 | End: 2018-10-30

## 2018-09-17 NOTE — TELEPHONE ENCOUNTER
Protocol failed for Glipizide - Last HgBA1C < 7.5    Medication(s) to Refill:   Requested Prescriptions     Pending Prescriptions Disp Refills   • LOSARTAN 100 MG Oral Tab [Pharmacy Med Name: LOSARTAN 100MG TABLETS] 90 tablet 0     Sig: TAKE 1 TABLET(100 M 2.0 1.0    Resulting Agency  [facility]         Specimen Collected: 09/08/18 12:00 PM   Last Resulted: 09/08/18  4:44 PM           Ref Range & Units 9/8/18 12:00 PM   Microalbumin, Urine mg/dL 79.80    Creatinine Ur Random mg/dL 136.00    Malb/Cre Calc <=3

## 2018-09-18 RX ORDER — SITAGLIPTIN 100 MG/1
TABLET, FILM COATED ORAL
Qty: 30 TABLET | Refills: 0 | Status: SHIPPED | OUTPATIENT
Start: 2018-09-18 | End: 2018-10-30

## 2018-09-18 NOTE — TELEPHONE ENCOUNTER
Protocol failed - Last HgBA1C < 7.5    Medication(s) to Refill:   Requested Prescriptions     Pending Prescriptions Disp Refills   • JANUVIA 100 MG Oral Tab [Pharmacy Med Name: JANUVIA 100MG TABLETS] 30 tablet 0     Sig: TAKE 1 TABLET(100 MG) BY MOUTH Dilip Dress

## 2018-10-30 DIAGNOSIS — E11.65 TYPE 2 DIABETES MELLITUS WITH HYPERGLYCEMIA, WITHOUT LONG-TERM CURRENT USE OF INSULIN (HCC): Chronic | ICD-10-CM

## 2018-11-01 RX ORDER — SITAGLIPTIN 100 MG/1
TABLET, FILM COATED ORAL
Qty: 30 TABLET | Refills: 0 | Status: SHIPPED | OUTPATIENT
Start: 2018-11-01 | End: 2018-11-29

## 2018-11-01 RX ORDER — GLIPIZIDE 5 MG/1
TABLET ORAL
Qty: 135 TABLET | Refills: 0 | Status: SHIPPED | OUTPATIENT
Start: 2018-11-01 | End: 2019-01-01

## 2018-11-01 RX ORDER — ASPIRIN 81 MG/1
TABLET, COATED ORAL
Qty: 90 TABLET | Refills: 0 | Status: SHIPPED | OUTPATIENT
Start: 2018-11-01 | End: 2019-02-05

## 2018-11-01 NOTE — TELEPHONE ENCOUNTER
Protocol failed - Last HgBA1C < 7.5    Medication(s) to Refill:   Requested Prescriptions     Pending Prescriptions Disp Refills   • GLIPIZIDE 5 MG Oral Tab [Pharmacy Med Name: GLIPIZIDE 5MG TABLETS] 135 tablet 0     Sig: TAKE 1 AND 1/2 TABLETS(7.5 MG) BY

## 2018-11-29 DIAGNOSIS — E11.65 TYPE 2 DIABETES MELLITUS WITH HYPERGLYCEMIA, WITHOUT LONG-TERM CURRENT USE OF INSULIN (HCC): Chronic | ICD-10-CM

## 2018-11-30 RX ORDER — SITAGLIPTIN 100 MG/1
TABLET, FILM COATED ORAL
Qty: 30 TABLET | Refills: 0 | Status: SHIPPED | OUTPATIENT
Start: 2018-11-30 | End: 2019-01-01

## 2018-11-30 NOTE — TELEPHONE ENCOUNTER
Medication(s) to Refill:   Requested Prescriptions     Pending Prescriptions Disp Refills   • JANUVIA 100 MG Oral Tab [Pharmacy Med Name: JANUVIA 100MG TABLETS] 30 tablet 0     Sig: TAKE 1 TABLET(100 MG) BY MOUTH DAILY       Last Time Medication was Filled

## 2018-12-17 ENCOUNTER — TELEPHONE (OUTPATIENT)
Dept: INTERNAL MEDICINE CLINIC | Facility: CLINIC | Age: 48
End: 2018-12-17

## 2018-12-17 DIAGNOSIS — I10 ESSENTIAL HYPERTENSION: Chronic | ICD-10-CM

## 2018-12-17 RX ORDER — LOSARTAN POTASSIUM 100 MG/1
TABLET ORAL
Qty: 90 TABLET | Refills: 0 | Status: SHIPPED | OUTPATIENT
Start: 2018-12-17 | End: 2019-03-13

## 2018-12-17 NOTE — TELEPHONE ENCOUNTER
Diabetic exam received from [facility] has been updated and report placed in providers inbox for review.

## 2018-12-17 NOTE — TELEPHONE ENCOUNTER
Refill requested:   Requested Prescriptions     Pending Prescriptions Disp Refills   • LOSARTAN 100 MG Oral Tab [Pharmacy Med Name: LOSARTAN 100MG TABLETS] 90 tablet 0     Sig: TAKE 1 TABLET(100 MG) BY MOUTH DAILY       Passed protocol    Last refill: 9/17

## 2019-01-01 DIAGNOSIS — E11.65 TYPE 2 DIABETES MELLITUS WITH HYPERGLYCEMIA, WITHOUT LONG-TERM CURRENT USE OF INSULIN (HCC): Chronic | ICD-10-CM

## 2019-01-01 NOTE — TELEPHONE ENCOUNTER
Refill requested:   Requested Prescriptions     Pending Prescriptions Disp Refills   • JANUVIA 100 MG Oral Tab [Pharmacy Med Name: JANUVIA 100MG TABLETS] 30 tablet 0     Sig: TAKE 1 TABLET(100 MG) BY MOUTH DAILY   • GLIPIZIDE 5 MG Oral Tab [Pharmacy Med Na

## 2019-01-02 RX ORDER — GLIPIZIDE 5 MG/1
TABLET ORAL
Qty: 135 TABLET | Refills: 0 | Status: SHIPPED | OUTPATIENT
Start: 2019-01-02 | End: 2019-02-23

## 2019-01-02 RX ORDER — SITAGLIPTIN 100 MG/1
TABLET, FILM COATED ORAL
Qty: 30 TABLET | Refills: 0 | Status: SHIPPED | OUTPATIENT
Start: 2019-01-02 | End: 2019-02-05

## 2019-02-05 DIAGNOSIS — E11.65 TYPE 2 DIABETES MELLITUS WITH HYPERGLYCEMIA, WITHOUT LONG-TERM CURRENT USE OF INSULIN (HCC): Chronic | ICD-10-CM

## 2019-02-05 NOTE — TELEPHONE ENCOUNTER
Protocol failed - Last HgBA1C < 7.5    Medication(s) to Refill:   Requested Prescriptions     Pending Prescriptions Disp Refills   • ASPIRIN LOW DOSE 81 MG Oral Tab EC [Pharmacy Med Name: ASPIRIN 81MG EC LOW DOSE  TABLETS] 90 tablet 0     Sig: TAKE 1 TABLE

## 2019-02-05 NOTE — TELEPHONE ENCOUNTER
1st attempt - [employer] message sent to patient to contact office to schedule CPX.     LOV 9/13/18  RTC 4 weeks for CPX

## 2019-02-07 RX ORDER — SITAGLIPTIN 100 MG/1
TABLET, FILM COATED ORAL
Qty: 30 TABLET | Refills: 0 | Status: SHIPPED | OUTPATIENT
Start: 2019-02-07 | End: 2019-03-13

## 2019-02-07 RX ORDER — ASPIRIN 81 MG/1
TABLET, COATED ORAL
Qty: 30 TABLET | Refills: 0 | Status: SHIPPED | OUTPATIENT
Start: 2019-02-07 | End: 2019-03-13

## 2019-02-23 DIAGNOSIS — E11.65 TYPE 2 DIABETES MELLITUS WITH HYPERGLYCEMIA, WITHOUT LONG-TERM CURRENT USE OF INSULIN (HCC): Chronic | ICD-10-CM

## 2019-02-26 RX ORDER — GLIPIZIDE 5 MG/1
TABLET ORAL
Qty: 135 TABLET | Refills: 0 | Status: SHIPPED | OUTPATIENT
Start: 2019-02-26 | End: 2019-04-15

## 2019-03-13 DIAGNOSIS — I10 ESSENTIAL HYPERTENSION: Chronic | ICD-10-CM

## 2019-03-13 DIAGNOSIS — E11.65 TYPE 2 DIABETES MELLITUS WITH HYPERGLYCEMIA, WITHOUT LONG-TERM CURRENT USE OF INSULIN (HCC): Chronic | ICD-10-CM

## 2019-03-13 NOTE — TELEPHONE ENCOUNTER
Refill requested:   Requested Prescriptions     Pending Prescriptions Disp Refills   • LOSARTAN 100 MG Oral Tab [Pharmacy Med Name: LOSARTAN 100MG TABLETS] 90 tablet 0     Sig: TAKE 1 TABLET(100 MG) BY MOUTH DAILY   • JANUVIA 100 MG Oral Tab [Pharmacy Med

## 2019-03-14 ENCOUNTER — OFFICE VISIT (OUTPATIENT)
Dept: INTERNAL MEDICINE CLINIC | Facility: CLINIC | Age: 49
End: 2019-03-14

## 2019-03-14 VITALS
OXYGEN SATURATION: 96 % | SYSTOLIC BLOOD PRESSURE: 140 MMHG | HEART RATE: 109 BPM | BODY MASS INDEX: 35.07 KG/M2 | HEIGHT: 69.75 IN | WEIGHT: 242.25 LBS | TEMPERATURE: 98 F | RESPIRATION RATE: 20 BRPM | DIASTOLIC BLOOD PRESSURE: 100 MMHG

## 2019-03-14 DIAGNOSIS — E11.65 TYPE 2 DIABETES MELLITUS WITH HYPERGLYCEMIA, WITHOUT LONG-TERM CURRENT USE OF INSULIN (HCC): Chronic | ICD-10-CM

## 2019-03-14 DIAGNOSIS — G89.29 CHRONIC RIGHT SHOULDER PAIN: ICD-10-CM

## 2019-03-14 DIAGNOSIS — R14.0 BLOATING: Primary | ICD-10-CM

## 2019-03-14 DIAGNOSIS — I10 ESSENTIAL HYPERTENSION: Chronic | ICD-10-CM

## 2019-03-14 DIAGNOSIS — M25.511 CHRONIC RIGHT SHOULDER PAIN: ICD-10-CM

## 2019-03-14 DIAGNOSIS — Z00.00 LABORATORY EXAMINATION ORDERED AS PART OF A ROUTINE GENERAL MEDICAL EXAMINATION: ICD-10-CM

## 2019-03-14 PROCEDURE — 99214 OFFICE O/P EST MOD 30 MIN: CPT | Performed by: INTERNAL MEDICINE

## 2019-03-14 RX ORDER — SITAGLIPTIN 100 MG/1
TABLET, FILM COATED ORAL
Qty: 30 TABLET | Refills: 1 | Status: SHIPPED | OUTPATIENT
Start: 2019-03-14 | End: 2019-05-24

## 2019-03-14 RX ORDER — DORZOLAMIDE HYDROCHLORIDE AND TIMOLOL MALEATE 20; 5 MG/ML; MG/ML
SOLUTION/ DROPS OPHTHALMIC
Refills: 6 | COMMUNITY
Start: 2018-11-08

## 2019-03-14 RX ORDER — LOSARTAN POTASSIUM 100 MG/1
TABLET ORAL
Qty: 30 TABLET | Refills: 1 | Status: SHIPPED | OUTPATIENT
Start: 2019-03-14 | End: 2019-05-24

## 2019-03-14 RX ORDER — SITAGLIPTIN 100 MG/1
TABLET, FILM COATED ORAL
Qty: 30 TABLET | Refills: 0 | OUTPATIENT
Start: 2019-03-14

## 2019-03-14 RX ORDER — BROMPHENIRAMINE MALEATE, PSEUDOEPHEDRINE HYDROCHLORIDE, AND DEXTROMETHORPHAN HYDROBROMIDE 2; 30; 10 MG/5ML; MG/5ML; MG/5ML
10 SYRUP ORAL AS NEEDED
Refills: 0 | COMMUNITY
Start: 2019-01-26 | End: 2019-09-18 | Stop reason: ALTCHOICE

## 2019-03-14 RX ORDER — ASPIRIN 81 MG/1
81 TABLET ORAL
Qty: 30 TABLET | Refills: 1 | Status: CANCELLED | OUTPATIENT
Start: 2019-03-14

## 2019-03-14 RX ORDER — ASPIRIN 81 MG/1
TABLET, COATED ORAL
Qty: 30 TABLET | Refills: 1 | Status: SHIPPED | OUTPATIENT
Start: 2019-03-14 | End: 2019-12-18

## 2019-03-14 NOTE — PATIENT INSTRUCTIONS
To see me for a CPX as soon as all the tests are done  Patient going to [place] for a few weeks

## 2019-03-14 NOTE — PROGRESS NOTES
Kiera Gan  1970 is a 46year old male. Patient presents with:  Stomach Pain      HPI:   Recent complains of postprandial bloating mainly in the mid abdomen region. Also still continues to have pain in the right shoulder.   Patient had been giv no. Abdominal pain no. Appetite change no. Black stools no. . Blood in stool no. Change in bowel habits no. Constipation no. Diarrhea no. Distention no. Dysphagia no. Loss of appetite none. Vomiting no. Weight loss no.    Genitourinary:   Loss of control of Future  -     MICROALB/CREAT RATIO, RANDOM URINE; Future  -     PSA SCREEN; Future  -     T4(THYROXINE TOTAL);  Future  -     ASSAY, THYROID STIM HORMONE; Future  -     URINALYSIS, ROUTINE; Future    Chronic right shoulder pain    Other orders  -     Cancel

## 2019-03-14 NOTE — TELEPHONE ENCOUNTER
Please convert the medicines for 30 days with 1 refill.   Patient needs to go for hemoglobin A1c CMP and lipid prior to any further refills

## 2019-03-15 DIAGNOSIS — E11.65 TYPE 2 DIABETES MELLITUS WITH HYPERGLYCEMIA, WITHOUT LONG-TERM CURRENT USE OF INSULIN (HCC): Chronic | ICD-10-CM

## 2019-03-15 RX ORDER — LOSARTAN POTASSIUM 100 MG/1
TABLET ORAL
Qty: 90 TABLET | Refills: 1 | OUTPATIENT
Start: 2019-03-15

## 2019-03-15 RX ORDER — SITAGLIPTIN 100 MG/1
TABLET, FILM COATED ORAL
Qty: 30 TABLET | Refills: 0 | Status: CANCELLED | OUTPATIENT
Start: 2019-03-15

## 2019-03-15 RX ORDER — ASPIRIN 81 MG/1
TABLET, COATED ORAL
Qty: 30 TABLET | Refills: 0 | Status: SHIPPED | OUTPATIENT
Start: 2019-03-15 | End: 2019-09-18

## 2019-03-15 RX ORDER — SITAGLIPTIN 100 MG/1
TABLET, FILM COATED ORAL
Qty: 30 TABLET | Refills: 0 | Status: SHIPPED | OUTPATIENT
Start: 2019-03-15 | End: 2019-05-24

## 2019-03-15 NOTE — TELEPHONE ENCOUNTER
Pt would like medication to be authorized today so he can  at pharmacy also wanted to let doctor now he has no more medication

## 2019-03-15 NOTE — TELEPHONE ENCOUNTER
Medications sent to wrong pharmacy in [place] - Rx's have been resent to [place] in [facility]

## 2019-03-15 NOTE — TELEPHONE ENCOUNTER
Protocol passed - Rx refused due to being filled on 3/14/19 at patients office visit.      Medication(s) to Refill:   Requested Prescriptions     Pending Prescriptions Disp Refills   • LOSARTAN 100 MG Oral Tab [Pharmacy Med Name: LOSARTAN 100MG TABLETS] 90

## 2019-03-16 ENCOUNTER — HOSPITAL ENCOUNTER (OUTPATIENT)
Dept: GENERAL RADIOLOGY | Age: 49
Discharge: HOME | End: 2019-03-16
Attending: INTERNAL MEDICINE
Payer: COMMERCIAL

## 2019-03-16 ENCOUNTER — LAB ENCOUNTER (OUTPATIENT)
Dept: LAB | Age: 49
End: 2019-03-16
Attending: INTERNAL MEDICINE
Payer: COMMERCIAL

## 2019-03-16 DIAGNOSIS — E11.65 TYPE 2 DIABETES MELLITUS WITH HYPERGLYCEMIA, WITHOUT LONG-TERM CURRENT USE OF INSULIN (HCC): Chronic | ICD-10-CM

## 2019-03-16 DIAGNOSIS — M25.511 CHRONIC RIGHT SHOULDER PAIN: ICD-10-CM

## 2019-03-16 DIAGNOSIS — Z00.00 LABORATORY EXAMINATION ORDERED AS PART OF A ROUTINE GENERAL MEDICAL EXAMINATION: ICD-10-CM

## 2019-03-16 DIAGNOSIS — G89.29 CHRONIC RIGHT SHOULDER PAIN: ICD-10-CM

## 2019-03-16 LAB
ALBUMIN SERPL-MCNC: 3.8 G/DL (ref 3.4–5)
ALBUMIN/GLOB SERPL: 1 {RATIO} (ref 1–2)
ALP LIVER SERPL-CCNC: 103 U/L (ref 45–117)
ALT SERPL-CCNC: 34 U/L (ref 16–61)
ANION GAP SERPL CALC-SCNC: 7 MMOL/L (ref 0–18)
AST SERPL-CCNC: 18 U/L (ref 15–37)
BASOPHILS # BLD AUTO: 0.05 X10(3) UL (ref 0–0.2)
BASOPHILS NFR BLD AUTO: 0.7 %
BILIRUB SERPL-MCNC: 0.7 MG/DL (ref 0.1–2)
BILIRUB UR QL STRIP.AUTO: NEGATIVE
BUN BLD-MCNC: 11 MG/DL (ref 7–18)
BUN/CREAT SERPL: 10.2 (ref 10–20)
CALCIUM BLD-MCNC: 9.1 MG/DL (ref 8.5–10.1)
CHLORIDE SERPL-SCNC: 103 MMOL/L (ref 98–107)
CHOLEST SMN-MCNC: 183 MG/DL (ref ?–200)
CLARITY UR REFRACT.AUTO: CLEAR
CO2 SERPL-SCNC: 26 MMOL/L (ref 21–32)
COLOR UR AUTO: YELLOW
COMPLEXED PSA SERPL-MCNC: 0.26 NG/ML (ref ?–4)
CREAT BLD-MCNC: 1.08 MG/DL (ref 0.7–1.3)
CREAT UR-SCNC: 149 MG/DL
DEPRECATED RDW RBC AUTO: 41.3 FL (ref 35.1–46.3)
EOSINOPHIL # BLD AUTO: 0.34 X10(3) UL (ref 0–0.7)
EOSINOPHIL NFR BLD AUTO: 4.7 %
ERYTHROCYTE [DISTWIDTH] IN BLOOD BY AUTOMATED COUNT: 13.5 % (ref 11–15)
EST. AVERAGE GLUCOSE BLD GHB EST-MCNC: 243 MG/DL (ref 68–126)
GLOBULIN PLAS-MCNC: 3.9 G/DL (ref 2.8–4.4)
GLUCOSE BLD-MCNC: 272 MG/DL (ref 70–99)
GLUCOSE UR STRIP.AUTO-MCNC: >=500 MG/DL
HBA1C MFR BLD HPLC: 10.1 % (ref ?–5.7)
HCT VFR BLD AUTO: 48.7 % (ref 39–53)
HDLC SERPL-MCNC: 47 MG/DL (ref 40–59)
HGB BLD-MCNC: 16.1 G/DL (ref 13–17.5)
IMM GRANULOCYTES # BLD AUTO: 0.03 X10(3) UL (ref 0–1)
IMM GRANULOCYTES NFR BLD: 0.4 %
KETONES UR STRIP.AUTO-MCNC: NEGATIVE MG/DL
LDLC SERPL CALC-MCNC: 116 MG/DL (ref ?–100)
LEUKOCYTE ESTERASE UR QL STRIP.AUTO: NEGATIVE
LYMPHOCYTES # BLD AUTO: 2.05 X10(3) UL (ref 1–4)
LYMPHOCYTES NFR BLD AUTO: 28.4 %
M PROTEIN MFR SERPL ELPH: 7.7 G/DL (ref 6.4–8.2)
MCH RBC QN AUTO: 27.8 PG (ref 26–34)
MCHC RBC AUTO-ENTMCNC: 33.1 G/DL (ref 31–37)
MCV RBC AUTO: 84.1 FL (ref 80–100)
MICROALBUMIN UR-MCNC: 124 MG/DL
MICROALBUMIN/CREAT 24H UR-RTO: 832.2 UG/MG (ref ?–30)
MONOCYTES # BLD AUTO: 0.54 X10(3) UL (ref 0.1–1)
MONOCYTES NFR BLD AUTO: 7.5 %
NEUTROPHILS # BLD AUTO: 4.22 X10 (3) UL (ref 1.5–7.7)
NEUTROPHILS # BLD AUTO: 4.22 X10(3) UL (ref 1.5–7.7)
NEUTROPHILS NFR BLD AUTO: 58.3 %
NITRITE UR QL STRIP.AUTO: NEGATIVE
NONHDLC SERPL-MCNC: 136 MG/DL (ref ?–130)
OSMOLALITY SERPL CALC.SUM OF ELEC: 291 MOSM/KG (ref 275–295)
PH UR STRIP.AUTO: 5 [PH] (ref 4.5–8)
PLATELET # BLD AUTO: 200 10(3)UL (ref 150–450)
POTASSIUM SERPL-SCNC: 4.4 MMOL/L (ref 3.5–5.1)
PROT UR STRIP.AUTO-MCNC: 100 MG/DL
RBC # BLD AUTO: 5.79 X10(6)UL (ref 4.3–5.7)
SODIUM SERPL-SCNC: 136 MMOL/L (ref 136–145)
SP GR UR STRIP.AUTO: 1.04 (ref 1–1.03)
T4 SERPL-MCNC: 6.3 UG/DL (ref 4.5–12.1)
TRIGL SERPL-MCNC: 100 MG/DL (ref 30–149)
TSI SER-ACNC: 4.09 MIU/ML (ref 0.36–3.74)
UROBILINOGEN UR STRIP.AUTO-MCNC: <2 MG/DL
VLDLC SERPL CALC-MCNC: 20 MG/DL (ref 0–30)
WBC # BLD AUTO: 7.2 X10(3) UL (ref 4–11)

## 2019-03-16 PROCEDURE — 73030 X-RAY EXAM OF SHOULDER: CPT | Performed by: INTERNAL MEDICINE

## 2019-03-16 PROCEDURE — 84436 ASSAY OF TOTAL THYROXINE: CPT

## 2019-03-16 PROCEDURE — 82570 ASSAY OF URINE CREATININE: CPT

## 2019-03-16 PROCEDURE — 85025 COMPLETE CBC W/AUTO DIFF WBC: CPT

## 2019-03-16 PROCEDURE — 80061 LIPID PANEL: CPT

## 2019-03-16 PROCEDURE — 81001 URINALYSIS AUTO W/SCOPE: CPT

## 2019-03-16 PROCEDURE — 36415 COLL VENOUS BLD VENIPUNCTURE: CPT

## 2019-03-16 PROCEDURE — 84443 ASSAY THYROID STIM HORMONE: CPT

## 2019-03-16 PROCEDURE — 80053 COMPREHEN METABOLIC PANEL: CPT

## 2019-03-16 PROCEDURE — 83036 HEMOGLOBIN GLYCOSYLATED A1C: CPT

## 2019-03-16 PROCEDURE — 82043 UR ALBUMIN QUANTITATIVE: CPT

## 2019-03-16 NOTE — TELEPHONE ENCOUNTER
Pharmacy called and stated that they never received the medication refills for aspirin and monserratuvia - Nick Vireliud August was given over the phone.

## 2019-03-18 ENCOUNTER — TELEPHONE (OUTPATIENT)
Dept: INTERNAL MEDICINE CLINIC | Facility: CLINIC | Age: 49
End: 2019-03-18

## 2019-03-18 DIAGNOSIS — E11.65 TYPE 2 DIABETES MELLITUS WITH HYPERGLYCEMIA, WITHOUT LONG-TERM CURRENT USE OF INSULIN (HCC): Primary | Chronic | ICD-10-CM

## 2019-03-18 NOTE — TELEPHONE ENCOUNTER
Opthamology Referral - Pt has appt tomorrow   Received:  Today   Message Contents   Chio Aranda 08 Clinical Staff   Cc: [facility] Referral Pool   Phone Number: [phone]             .Reason for the order/referral: DM Eye Exam   PCP: [facility]

## 2019-04-15 DIAGNOSIS — E11.65 TYPE 2 DIABETES MELLITUS WITH HYPERGLYCEMIA, WITHOUT LONG-TERM CURRENT USE OF INSULIN (HCC): Chronic | ICD-10-CM

## 2019-04-16 RX ORDER — ASPIRIN 81 MG/1
TABLET ORAL
Qty: 30 TABLET | Refills: 0 | OUTPATIENT
Start: 2019-04-16

## 2019-04-16 RX ORDER — GLIPIZIDE 5 MG/1
TABLET ORAL
Qty: 135 TABLET | Refills: 0 | Status: SHIPPED | OUTPATIENT
Start: 2019-04-16 | End: 2019-06-19

## 2019-04-16 RX ORDER — SITAGLIPTIN 100 MG/1
TABLET, FILM COATED ORAL
Qty: 30 TABLET | Refills: 0 | OUTPATIENT
Start: 2019-04-16

## 2019-04-16 NOTE — TELEPHONE ENCOUNTER
Protocol failed - Last HgBA1C < 7.5   Januvia and Aspirin refused due to being filled on 3/14/19 with 30 and 1 refill.      Medication(s) to Refill:   Requested Prescriptions     Pending Prescriptions Disp Refills   • JANUVIA 100 MG Oral Tab [Pharmacy Med N

## 2019-04-17 RX ORDER — GLIPIZIDE 5 MG/1
TABLET ORAL
Qty: 270 TABLET | Refills: 0 | OUTPATIENT
Start: 2019-04-17

## 2019-04-17 NOTE — TELEPHONE ENCOUNTER
(Declined - duplicate request)    Failed protocol     Last refill:  4/16/2019 #135 NR    Last HA1C 3/16/2019     LOV   3/14/2019 Dr Bryson Loud RTC 4 weeks     No future OV

## 2019-04-20 DIAGNOSIS — E11.65 TYPE 2 DIABETES MELLITUS WITH HYPERGLYCEMIA, WITHOUT LONG-TERM CURRENT USE OF INSULIN (HCC): Chronic | ICD-10-CM

## 2019-04-22 RX ORDER — ASPIRIN 81 MG/1
TABLET, COATED ORAL
Qty: 30 TABLET | Refills: 0 | Status: SHIPPED | OUTPATIENT
Start: 2019-04-22 | End: 2019-05-24

## 2019-04-22 RX ORDER — SITAGLIPTIN 100 MG/1
TABLET, FILM COATED ORAL
Qty: 30 TABLET | Refills: 0 | Status: SHIPPED | OUTPATIENT
Start: 2019-04-22 | End: 2019-05-24

## 2019-04-22 NOTE — TELEPHONE ENCOUNTER
Protocol failed - Last HgBA1C < 7.5    Medication(s) to Refill:   Requested Prescriptions     Pending Prescriptions Disp Refills   • ASPIRIN LOW DOSE 81 MG Oral Tab EC [Pharmacy Med Name: ASPIRIN 81MG EC LOW DOSE TABLETS] 30 tablet 0     Sig: TAKE 1 TABLET

## 2019-05-24 DIAGNOSIS — E11.65 TYPE 2 DIABETES MELLITUS WITH HYPERGLYCEMIA, WITHOUT LONG-TERM CURRENT USE OF INSULIN (HCC): Chronic | ICD-10-CM

## 2019-05-24 DIAGNOSIS — I10 ESSENTIAL HYPERTENSION: Chronic | ICD-10-CM

## 2019-05-24 RX ORDER — SITAGLIPTIN 100 MG/1
TABLET, FILM COATED ORAL
Qty: 30 TABLET | Refills: 0 | Status: SHIPPED | OUTPATIENT
Start: 2019-05-24 | End: 2019-06-19

## 2019-05-24 RX ORDER — LOSARTAN POTASSIUM 100 MG/1
TABLET ORAL
Qty: 30 TABLET | Refills: 0 | Status: SHIPPED | OUTPATIENT
Start: 2019-05-24 | End: 2019-06-19

## 2019-05-24 RX ORDER — ASPIRIN 81 MG/1
TABLET, COATED ORAL
Qty: 30 TABLET | Refills: 0 | Status: SHIPPED | OUTPATIENT
Start: 2019-05-24 | End: 2019-06-19

## 2019-05-24 NOTE — TELEPHONE ENCOUNTER
Protocol failed for Januvia and Metformin - Last HgBA1C < 7.5    Medication(s) to Refill:   Requested Prescriptions     Pending Prescriptions Disp Refills   • ASPIRIN LOW DOSE 81 MG Oral Tab EC [Pharmacy Med Name: ASPIRIN 81MG EC LOW DOSE TABLETS] 30 table [facility]25/2016    [facility]

## 2019-06-19 DIAGNOSIS — E11.65 TYPE 2 DIABETES MELLITUS WITH HYPERGLYCEMIA, WITHOUT LONG-TERM CURRENT USE OF INSULIN (HCC): Chronic | ICD-10-CM

## 2019-06-19 DIAGNOSIS — I10 ESSENTIAL HYPERTENSION: Chronic | ICD-10-CM

## 2019-06-19 RX ORDER — GLIPIZIDE 5 MG/1
TABLET ORAL
Qty: 135 TABLET | Refills: 0 | Status: SHIPPED | OUTPATIENT
Start: 2019-06-19 | End: 2019-08-24

## 2019-06-19 NOTE — TELEPHONE ENCOUNTER
Failed protocol     Last refill:  4/16/19 #135 NR     Last A1C - 3/6/2019     Last OV:   3/14/2019 Dr Cassaundra Eisenmenger RTC 4 weeks     No FOV scheduled

## 2019-06-20 DIAGNOSIS — E11.65 TYPE 2 DIABETES MELLITUS WITH HYPERGLYCEMIA, WITHOUT LONG-TERM CURRENT USE OF INSULIN (HCC): Chronic | ICD-10-CM

## 2019-06-20 RX ORDER — ASPIRIN 81 MG/1
TABLET, COATED ORAL
Qty: 30 TABLET | Refills: 0 | Status: SHIPPED | OUTPATIENT
Start: 2019-06-20 | End: 2019-07-25

## 2019-06-20 RX ORDER — SITAGLIPTIN 100 MG/1
TABLET, FILM COATED ORAL
Qty: 90 TABLET | Refills: 0 | OUTPATIENT
Start: 2019-06-20

## 2019-06-20 RX ORDER — SITAGLIPTIN 100 MG/1
TABLET, FILM COATED ORAL
Qty: 30 TABLET | Refills: 0 | Status: SHIPPED | OUTPATIENT
Start: 2019-06-20 | End: 2019-07-25

## 2019-06-20 RX ORDER — LOSARTAN POTASSIUM 100 MG/1
TABLET ORAL
Qty: 90 TABLET | Refills: 0 | Status: SHIPPED | OUTPATIENT
Start: 2019-06-20 | End: 2019-09-18

## 2019-06-20 NOTE — TELEPHONE ENCOUNTER
Failed protocol     Last refill:  5/24/2019 Losartan 100 mg #30 NR  5/24/2019 Aspirin 81 mg #30 NR  5/24/2019 Januvia 100 mg #30 NR  5/24/2019 Metformin 1000 mg #180 NR    Last A1C -   3/16/2019     LOV:   3/14/2019 Dr Morgan Arias RTC 4 weeks     No FOV schedu

## 2019-06-27 ENCOUNTER — TELEPHONE (OUTPATIENT)
Dept: INTERNAL MEDICINE CLINIC | Facility: CLINIC | Age: 49
End: 2019-06-27

## 2019-06-27 NOTE — TELEPHONE ENCOUNTER
Patient's voicemail box is full; will try reaching out later. Please schedule CPX for patient. Last labs 3/16/19.

## 2019-07-03 NOTE — TELEPHONE ENCOUNTER
[facility] office on patient's VM. Please notify patient due for CPX. Last labs 3/16/19.

## 2019-07-25 DIAGNOSIS — E11.65 TYPE 2 DIABETES MELLITUS WITH HYPERGLYCEMIA, WITHOUT LONG-TERM CURRENT USE OF INSULIN (HCC): Chronic | ICD-10-CM

## 2019-07-25 RX ORDER — ASPIRIN 81 MG/1
TABLET, COATED ORAL
Qty: 30 TABLET | Refills: 0 | Status: SHIPPED | OUTPATIENT
Start: 2019-07-25 | End: 2019-08-24

## 2019-07-25 RX ORDER — SITAGLIPTIN 100 MG/1
TABLET, FILM COATED ORAL
Qty: 30 TABLET | Refills: 0 | Status: SHIPPED | OUTPATIENT
Start: 2019-07-25 | End: 2019-08-24

## 2019-07-25 NOTE — TELEPHONE ENCOUNTER
Protocol failed - Last HgBA1C < 7.5    Medication(s) to Refill:   Requested Prescriptions     Pending Prescriptions Disp Refills   • ASPIRIN LOW DOSE 81 MG Oral Tab EC [Pharmacy Med Name: ASPIRIN 81MG EC LOW DOSE  TABLETS] 30 tablet 0     Sig: TAKE 1 TABLE

## 2019-08-24 DIAGNOSIS — E11.65 TYPE 2 DIABETES MELLITUS WITH HYPERGLYCEMIA, WITHOUT LONG-TERM CURRENT USE OF INSULIN (HCC): Chronic | ICD-10-CM

## 2019-08-24 NOTE — TELEPHONE ENCOUNTER
Last OV: 3/14/19 with Dr. Ariella Singh  Last refill date: 7/25/19     #/refills: #30, 0 refills  When pt was asked to return for OV: 4 weeks  Upcoming appt/reason: no upcoming appt  Last labs 3/16/19

## 2019-08-25 RX ORDER — SITAGLIPTIN 100 MG/1
TABLET, FILM COATED ORAL
Qty: 30 TABLET | Refills: 0 | Status: SHIPPED | OUTPATIENT
Start: 2019-08-25 | End: 2019-09-18

## 2019-08-26 RX ORDER — ASPIRIN 81 MG/1
TABLET, COATED ORAL
Qty: 30 TABLET | Refills: 0 | Status: SHIPPED | OUTPATIENT
Start: 2019-08-26 | End: 2019-09-18

## 2019-08-26 NOTE — TELEPHONE ENCOUNTER
Failed protocol     Last refill:  6/19/2019 Glipizide 5 mg #135 NR    HA1C 10.1 -  3/16/2019     LOV:   3/14/2019 Dr Re Yates RTC 4 weeks     NO FOV scheduled

## 2019-08-26 NOTE — TELEPHONE ENCOUNTER
Last OV: 3/14/19 with Dr. Re Yates  Last refill date: 7/25/19     #/refills: #30, 0 refills  When pt was asked to return for OV: 4 weeks  Upcoming appt/reason: no upcoming appt  Last labs 3/16/19

## 2019-08-27 RX ORDER — GLIPIZIDE 5 MG/1
TABLET ORAL
Qty: 135 TABLET | Refills: 0 | Status: SHIPPED | OUTPATIENT
Start: 2019-08-27 | End: 2019-11-04

## 2019-09-18 ENCOUNTER — HOSPITAL ENCOUNTER (OUTPATIENT)
Dept: GENERAL RADIOLOGY | Facility: HOSPITAL | Age: 49
Discharge: HOME | End: 2019-09-18
Attending: INTERNAL MEDICINE
Payer: COMMERCIAL

## 2019-09-18 ENCOUNTER — OFFICE VISIT (OUTPATIENT)
Dept: INTERNAL MEDICINE CLINIC | Facility: CLINIC | Age: 49
End: 2019-09-18

## 2019-09-18 VITALS
HEART RATE: 108 BPM | WEIGHT: 242 LBS | BODY MASS INDEX: 35.04 KG/M2 | RESPIRATION RATE: 12 BRPM | HEIGHT: 69.75 IN | TEMPERATURE: 98 F | SYSTOLIC BLOOD PRESSURE: 126 MMHG | DIASTOLIC BLOOD PRESSURE: 90 MMHG

## 2019-09-18 DIAGNOSIS — I10 ESSENTIAL HYPERTENSION: Chronic | ICD-10-CM

## 2019-09-18 DIAGNOSIS — M25.561 ACUTE PAIN OF RIGHT KNEE: ICD-10-CM

## 2019-09-18 DIAGNOSIS — J30.2 SEASONAL ALLERGIC RHINITIS, UNSPECIFIED TRIGGER: Primary | ICD-10-CM

## 2019-09-18 DIAGNOSIS — E78.00 HYPERCHOLESTEREMIA: Chronic | ICD-10-CM

## 2019-09-18 DIAGNOSIS — E11.65 TYPE 2 DIABETES MELLITUS WITH HYPERGLYCEMIA, WITHOUT LONG-TERM CURRENT USE OF INSULIN (HCC): Chronic | ICD-10-CM

## 2019-09-18 PROCEDURE — 99214 OFFICE O/P EST MOD 30 MIN: CPT | Performed by: INTERNAL MEDICINE

## 2019-09-18 PROCEDURE — 73560 X-RAY EXAM OF KNEE 1 OR 2: CPT | Performed by: INTERNAL MEDICINE

## 2019-09-18 RX ORDER — LOSARTAN POTASSIUM 100 MG/1
100 TABLET ORAL DAILY
Qty: 90 TABLET | Refills: 1 | Status: SHIPPED | OUTPATIENT
Start: 2019-09-18 | End: 2020-03-20

## 2019-09-18 RX ORDER — AZELASTINE 1 MG/ML
1 SPRAY, METERED NASAL 2 TIMES DAILY
Qty: 30 ML | Refills: 0 | Status: SHIPPED | OUTPATIENT
Start: 2019-09-18

## 2019-09-18 NOTE — PROGRESS NOTES
Tamera Riedel is a 46year old male. HPI:   Patient presents with:  Runny Nose: Pt c/o runny nose and feeling like he has a fever. Has not checked temp. C/o body aches and chills. Taking Zyrtec, Tylenol, and Allegra.     Patient presents with acute complaint by Nasal route 2 (two) times daily. , Disp: 30 mL, Rfl: 0  •  GLIPIZIDE 5 MG Oral Tab, TAKE 1 AND 1/2 TABLETS(7.5 MG) BY MOUTH TWICE DAILY BEFORE MEALS, Disp: 135 tablet, Rfl: 0  •  ASPIRIN LOW DOSE 81 MG Oral Tab EC, TAKE 1 TABLET BY MOUTH EVERY DAY, Disp: knee  PSYCH: pleasant, appropriate mood and affect  ASSESSMENT AND PLAN:   1. Seasonal allergic rhinitis, unspecified trigger  Post-nasal drip, runny nose. Hyperemic nares, otherwise normal HEENT exam.  Lungs clear. Azelastine nasal spray prescribed.   Christy Ling 3-6 months with Dr. Radha Smith MD for follow up on chronic issues, or earlier if acute issues arise.     Nica Liu MD

## 2019-09-18 NOTE — PATIENT INSTRUCTIONS
- Follow up with diabetes clinic this afternoon  - Our x-ray machine is down - you can go to [facility] or [facility] for x-ray or get it done here when our machine is working.  - Medications refilled (except for glipizide).   - We will leave lab

## 2019-09-19 DIAGNOSIS — M25.461 EFFUSION OF RIGHT KNEE JOINT: ICD-10-CM

## 2019-09-19 DIAGNOSIS — M25.561 ACUTE PAIN OF RIGHT KNEE: Primary | ICD-10-CM

## 2019-09-19 RX ORDER — LOSARTAN POTASSIUM 100 MG/1
TABLET ORAL
Qty: 90 TABLET | Refills: 0 | OUTPATIENT
Start: 2019-09-19

## 2019-09-19 NOTE — TELEPHONE ENCOUNTER
LMTCB x 1 - need to clarify if pt is on medication as he did not state this one yesterday.      Passed protocol     Last refill:  2019 Losartan 100 mg #90 1R - [facility] in [place]  2018 Atorvastatin 20 mg #90 3R -  rx     2019 Dr Diogenes Lennon

## 2019-09-24 RX ORDER — ASPIRIN 81 MG/1
TABLET, COATED ORAL
Qty: 30 TABLET | Refills: 0 | Status: SHIPPED | OUTPATIENT
Start: 2019-09-24 | End: 2019-11-04

## 2019-09-24 RX ORDER — ATORVASTATIN CALCIUM 20 MG/1
TABLET, FILM COATED ORAL
Qty: 90 TABLET | Refills: 0 | Status: SHIPPED | OUTPATIENT
Start: 2019-09-24 | End: 2020-03-20

## 2019-09-24 RX ORDER — BLOOD SUGAR DIAGNOSTIC
STRIP MISCELLANEOUS
Qty: 100 STRIP | Refills: 1 | Status: SHIPPED | OUTPATIENT
Start: 2019-09-24 | End: 2021-03-08

## 2019-09-24 NOTE — TELEPHONE ENCOUNTER
No protocol    Requesting ASPIRIN LOW DOSE 81 MG Oral Tab EC  LOV: 9/18/19  RTC: 3-6 months  Last Relevant Labs: 3/16/19  Filled: 3/14/19 #30 with 1 refills    Future appointments: None

## 2019-10-11 ENCOUNTER — TELEPHONE (OUTPATIENT)
Dept: ENDOCRINOLOGY CLINIC | Facility: CLINIC | Age: 49
End: 2019-10-11

## 2019-10-11 NOTE — TELEPHONE ENCOUNTER
LMTCB let patient know appointment for 10/16 was cancelled due to scheduling error. Pt is new to [facility] so appt should be 45minutes. Also let patient know he has two cancellations and a no show.  If he does not keep next appointment he will not be resche

## 2019-11-05 RX ORDER — ASPIRIN 81 MG/1
TABLET, COATED ORAL
Qty: 30 TABLET | Refills: 0 | Status: SHIPPED | OUTPATIENT
Start: 2019-11-05 | End: 2019-12-09

## 2019-11-05 RX ORDER — GLIPIZIDE 5 MG/1
TABLET ORAL
Qty: 135 TABLET | Refills: 0 | Status: SHIPPED | OUTPATIENT
Start: 2019-11-05 | End: 2020-01-16

## 2019-11-05 NOTE — TELEPHONE ENCOUNTER
ASPIRIN LOW DOSE 81 MG Oral  And GLIPIZIDE 5 MG Oral     Last OV relevant to medication: 9/18/2019    Last refill date:9/24/2019 # 30 tabs with 0 refills     When pt was asked to return for OV: 3-6 months     Upcoming appt/reason: none    Labs: 3-

## 2019-12-10 NOTE — TELEPHONE ENCOUNTER
No protocol    Requesting ASPIRIN LOW DOSE 81 MG Oral Tab EC  LOV: 9/18/19  RTC: 3-6 months  Last Relevant Labs: 3/16/19  Filled: 11/5/19 #30 with 0 refills    Future Appointments   Date Time Provider Ion Salas   12/18/2019  1:00 PM Irais Gomez

## 2019-12-11 RX ORDER — ASPIRIN 81 MG/1
TABLET, COATED ORAL
Qty: 30 TABLET | Refills: 0 | Status: SHIPPED | OUTPATIENT
Start: 2019-12-11 | End: 2020-01-16

## 2019-12-18 ENCOUNTER — OFFICE VISIT (OUTPATIENT)
Dept: ENDOCRINOLOGY CLINIC | Facility: CLINIC | Age: 49
End: 2019-12-18

## 2019-12-18 VITALS
HEART RATE: 94 BPM | SYSTOLIC BLOOD PRESSURE: 140 MMHG | WEIGHT: 239 LBS | HEIGHT: 69.75 IN | BODY MASS INDEX: 34.6 KG/M2 | DIASTOLIC BLOOD PRESSURE: 92 MMHG

## 2019-12-18 DIAGNOSIS — E11.65 TYPE 2 DIABETES MELLITUS WITH HYPERGLYCEMIA, WITHOUT LONG-TERM CURRENT USE OF INSULIN (HCC): Primary | Chronic | ICD-10-CM

## 2019-12-18 PROBLEM — E11.21 DIABETIC NEPHROPATHY ASSOCIATED WITH TYPE 2 DIABETES MELLITUS (HCC): Status: ACTIVE | Noted: 2019-12-18

## 2019-12-18 PROCEDURE — 99215 OFFICE O/P EST HI 40 MIN: CPT | Performed by: NURSE PRACTITIONER

## 2019-12-18 PROCEDURE — 83036 HEMOGLOBIN GLYCOSYLATED A1C: CPT | Performed by: NURSE PRACTITIONER

## 2019-12-18 RX ORDER — FLASH GLUCOSE SENSOR
KIT MISCELLANEOUS
Qty: 2 EACH | Refills: 2 | Status: SHIPPED | OUTPATIENT
Start: 2019-12-18 | End: 2020-07-29

## 2019-12-18 NOTE — PATIENT INSTRUCTIONS
We are here to support you with Diabetes but please remember that you still need your primary care doctor for your routine health maintenance.    Your A1C: 9.7%  This is too high for you and we will work together on lowering your blood sugars to help improv testing:   Always bring your glucose meter or blood sugar logbook to every appointment here at the diabetes center. This allows me to safely make adjustments to your diabetes plan.    In order for me to determine any patterns in your blood sugars, you shou motion (like pulling a bandaid off)  Sometimes, applying warm soapy water or lotion can help loosen the tape around the sensor. Note: Any remaining adhesive residue on the skin can be removed with warm soapy water or rubbing alcohol.     [facility] than one hour away, eat a small snack. 5. If you’re not sure what caused your low blood glucose, call your healthcare provider.   6. Always check your blood glucose before you drive     Kaylee James  [phone]

## 2019-12-18 NOTE — ASSESSMENT & PLAN NOTE
Had lengthy discussion regarding poor glycemic control. Patient was reminded their health is being jeopardized with these high glucose trends. Discussed importance of better glucose control to prevent onset /progression of DM complications.    Discuss

## 2019-12-18 NOTE — PROGRESS NOTES
Tasneem Cameron is a 44year old male who presents today to establish for diabetes management.    Primary care physician: Karne Young MD   In the past 3 m DM control has improved only slightlyto 9.7%  ( last A1C 10.1% 3-2019)   Has started to exercise intermi breads, rice and grains.      Exercise: 2 d /week   Recent steroids, illness or infections: no     Allergies: Seasonal    Past Medical History:   Diagnosis Date   • Chronic right shoulder pain 9/13/2018   • Diabetes ([address]    • Esophageal reflux    • Hyperli daily as needed.  ) 1 Bottle 3   • Azelastine HCl 0.1 % Nasal Solution 1 spray by Nasal route 2 (two) times daily. (Patient not taking: Reported on 12/18/2019 ) 30 mL 0     Review of Systems   Constitutional: Positive for fatigue.  Negative for unexpected w glucose control to prevent onset /progression of DM complications.    Discussed with patient that the risk of developing irreversible microvascular complications (blindness, kidney problems, nerve problems, etc) is 15% higher  for every 1% increment A1c >7% discussed. 30-45 CHO gm per meal - handouts provided   Reviewed hypoglycemia signs/symptoms, treatment using the Rule of 15' and when to call DM center .   Provided pt with handouts for reference   Discussed with pt importance of carrying glucose tablets i

## 2020-01-07 ENCOUNTER — OFFICE VISIT (OUTPATIENT)
Dept: INTERNAL MEDICINE CLINIC | Facility: CLINIC | Age: 50
End: 2020-01-07

## 2020-01-07 VITALS
HEART RATE: 112 BPM | DIASTOLIC BLOOD PRESSURE: 86 MMHG | HEIGHT: 69.25 IN | SYSTOLIC BLOOD PRESSURE: 140 MMHG | RESPIRATION RATE: 18 BRPM | BODY MASS INDEX: 34.85 KG/M2 | WEIGHT: 238 LBS | TEMPERATURE: 98 F | OXYGEN SATURATION: 98 %

## 2020-01-07 DIAGNOSIS — G47.33 OSA (OBSTRUCTIVE SLEEP APNEA): ICD-10-CM

## 2020-01-07 DIAGNOSIS — I10 ESSENTIAL HYPERTENSION: Chronic | ICD-10-CM

## 2020-01-07 DIAGNOSIS — Z00.00 ROUTINE GENERAL MEDICAL EXAMINATION AT A HEALTH CARE FACILITY: Primary | ICD-10-CM

## 2020-01-07 DIAGNOSIS — L60.3 DYSTROPHIC NAIL: ICD-10-CM

## 2020-01-07 DIAGNOSIS — E11.65 TYPE 2 DIABETES MELLITUS WITH HYPERGLYCEMIA, WITHOUT LONG-TERM CURRENT USE OF INSULIN (HCC): Chronic | ICD-10-CM

## 2020-01-07 PROBLEM — M25.511 CHRONIC RIGHT SHOULDER PAIN: Status: RESOLVED | Noted: 2018-09-13 | Resolved: 2020-01-07

## 2020-01-07 PROBLEM — J30.2 SEASONAL ALLERGIC RHINITIS: Chronic | Status: ACTIVE | Noted: 2019-09-18

## 2020-01-07 PROBLEM — G89.29 CHRONIC RIGHT SHOULDER PAIN: Status: RESOLVED | Noted: 2018-09-13 | Resolved: 2020-01-07

## 2020-01-07 PROBLEM — E11.21 DIABETIC NEPHROPATHY ASSOCIATED WITH TYPE 2 DIABETES MELLITUS (HCC): Chronic | Status: ACTIVE | Noted: 2019-12-18

## 2020-01-07 PROCEDURE — 99213 OFFICE O/P EST LOW 20 MIN: CPT | Performed by: INTERNAL MEDICINE

## 2020-01-07 PROCEDURE — 99396 PREV VISIT EST AGE 40-64: CPT | Performed by: INTERNAL MEDICINE

## 2020-01-07 PROCEDURE — 93000 ELECTROCARDIOGRAM COMPLETE: CPT | Performed by: INTERNAL MEDICINE

## 2020-01-07 PROCEDURE — 90471 IMMUNIZATION ADMIN: CPT | Performed by: INTERNAL MEDICINE

## 2020-01-07 PROCEDURE — 90686 IIV4 VACC NO PRSV 0.5 ML IM: CPT | Performed by: INTERNAL MEDICINE

## 2020-01-07 NOTE — PROGRESS NOTES
Bessie Scales  1970 is a 44year old male. Patient presents with:  Physical      HPI:   complete physical  Current Outpatient Medications   Medication Sig Dispense Refill   • Empagliflozin (JARDIANCE) 10 MG Oral Tab Take 1 tablet by mouth daily.  80 HEENT/Neck:   Head no dizziness, no lightheadedness. Eyes does have glaucoma.  Does see Dr. Lorri Najjar, no redness , no drainage. Ears no earaches, no fullness, normal hearing, no tinnitus.  Nose and Sinuses no recurrent colds, no stuffiness, no discharge, weakness. Tingling/numbness none. Trouble with balance none. Psychiatric:   Patient denies anxiety, depression, hallucinations. Insomnia none/ hx of sleep disorder known to the patient however does have episodes of snoring-see sleep questionnaire.  Memory intact. Gait: normal.   Motor: Power,tone,co-ordination normalInvoluntary movements and wasting none. Reflexes: normal.   Sensory: all sensory modalities normal.   LYMPHATICS:   Cervical: none. Groin: no adenopathy . Inguinal: no adenopathy.    Supr

## 2020-01-08 ENCOUNTER — NURSE ONLY (OUTPATIENT)
Dept: ENDOCRINOLOGY CLINIC | Facility: CLINIC | Age: 50
End: 2020-01-08

## 2020-01-08 VITALS — BODY MASS INDEX: 34.85 KG/M2 | HEIGHT: 69.25 IN | WEIGHT: 238 LBS

## 2020-01-08 DIAGNOSIS — Z79.4 TYPE 2 DIABETES MELLITUS WITH COMPLICATION, WITH LONG-TERM CURRENT USE OF INSULIN (HCC): Primary | ICD-10-CM

## 2020-01-08 DIAGNOSIS — E11.8 TYPE 2 DIABETES MELLITUS WITH COMPLICATION, WITH LONG-TERM CURRENT USE OF INSULIN (HCC): Primary | ICD-10-CM

## 2020-01-08 PROCEDURE — G0108 DIAB MANAGE TRN  PER INDIV: HCPCS

## 2020-01-09 NOTE — PROGRESS NOTES
Stephanie Burns  : 1970 attended Step 1 Diabetic Education:    Date: 2020  Referring Provider: Abdullahi Sommers  Start time: 3pm End time: 4pm    Ht 69.25\"   Wt 238 lb (108 kg)   BMI 34.89 kg/m²     HEMOGLOBIN A1C (%)   Date Value   2019 9.7 (A)

## 2020-01-10 ENCOUNTER — LAB ENCOUNTER (OUTPATIENT)
Dept: LAB | Age: 50
End: 2020-01-10
Attending: INTERNAL MEDICINE
Payer: COMMERCIAL

## 2020-01-10 DIAGNOSIS — Z00.00 ROUTINE GENERAL MEDICAL EXAMINATION AT A HEALTH CARE FACILITY: ICD-10-CM

## 2020-01-10 DIAGNOSIS — E11.65 TYPE 2 DIABETES MELLITUS WITH HYPERGLYCEMIA, WITHOUT LONG-TERM CURRENT USE OF INSULIN (HCC): Chronic | ICD-10-CM

## 2020-01-10 DIAGNOSIS — L60.3 DYSTROPHIC NAIL: ICD-10-CM

## 2020-01-10 LAB
ALBUMIN SERPL-MCNC: 3.9 G/DL (ref 3.4–5)
ALBUMIN/GLOB SERPL: 1.1 {RATIO} (ref 1–2)
ALP LIVER SERPL-CCNC: 84 U/L (ref 45–117)
ALT SERPL-CCNC: 31 U/L (ref 16–61)
ANION GAP SERPL CALC-SCNC: 4 MMOL/L (ref 0–18)
AST SERPL-CCNC: 13 U/L (ref 15–37)
BASOPHILS # BLD AUTO: 0.05 X10(3) UL (ref 0–0.2)
BASOPHILS NFR BLD AUTO: 0.7 %
BILIRUB SERPL-MCNC: 0.8 MG/DL (ref 0.1–2)
BILIRUB UR QL STRIP.AUTO: NEGATIVE
BUN BLD-MCNC: 15 MG/DL (ref 7–18)
BUN/CREAT SERPL: 13.6 (ref 10–20)
CALCIUM BLD-MCNC: 9.2 MG/DL (ref 8.5–10.1)
CHLORIDE SERPL-SCNC: 104 MMOL/L (ref 98–112)
CHOLEST SMN-MCNC: 164 MG/DL (ref ?–200)
CLARITY UR REFRACT.AUTO: CLEAR
CO2 SERPL-SCNC: 27 MMOL/L (ref 21–32)
COLOR UR AUTO: YELLOW
COMPLEXED PSA SERPL-MCNC: 0.28 NG/ML (ref ?–4)
CREAT BLD-MCNC: 1.1 MG/DL (ref 0.7–1.3)
CREAT UR-SCNC: 80.5 MG/DL
DEPRECATED RDW RBC AUTO: 42.3 FL (ref 35.1–46.3)
EOSINOPHIL # BLD AUTO: 0.34 X10(3) UL (ref 0–0.7)
EOSINOPHIL NFR BLD AUTO: 4.8 %
ERYTHROCYTE [DISTWIDTH] IN BLOOD BY AUTOMATED COUNT: 13.5 % (ref 11–15)
GLOBULIN PLAS-MCNC: 3.7 G/DL (ref 2.8–4.4)
GLUCOSE BLD-MCNC: 173 MG/DL (ref 70–99)
GLUCOSE UR STRIP.AUTO-MCNC: >=500 MG/DL
HCT VFR BLD AUTO: 48.7 % (ref 39–53)
HDLC SERPL-MCNC: 42 MG/DL (ref 40–59)
HGB BLD-MCNC: 16.1 G/DL (ref 13–17.5)
IMM GRANULOCYTES # BLD AUTO: 0.03 X10(3) UL (ref 0–1)
IMM GRANULOCYTES NFR BLD: 0.4 %
KETONES UR STRIP.AUTO-MCNC: NEGATIVE MG/DL
LDLC SERPL CALC-MCNC: 99 MG/DL (ref ?–100)
LEUKOCYTE ESTERASE UR QL STRIP.AUTO: NEGATIVE
LYMPHOCYTES # BLD AUTO: 1.87 X10(3) UL (ref 1–4)
LYMPHOCYTES NFR BLD AUTO: 26.3 %
M PROTEIN MFR SERPL ELPH: 7.6 G/DL (ref 6.4–8.2)
MCH RBC QN AUTO: 28.6 PG (ref 26–34)
MCHC RBC AUTO-ENTMCNC: 33.1 G/DL (ref 31–37)
MCV RBC AUTO: 86.7 FL (ref 80–100)
MICROALBUMIN UR-MCNC: 30.9 MG/DL
MICROALBUMIN/CREAT 24H UR-RTO: 383.9 UG/MG (ref ?–30)
MONOCYTES # BLD AUTO: 0.59 X10(3) UL (ref 0.1–1)
MONOCYTES NFR BLD AUTO: 8.3 %
NEUTROPHILS # BLD AUTO: 4.24 X10 (3) UL (ref 1.5–7.7)
NEUTROPHILS # BLD AUTO: 4.24 X10(3) UL (ref 1.5–7.7)
NEUTROPHILS NFR BLD AUTO: 59.5 %
NITRITE UR QL STRIP.AUTO: NEGATIVE
NONHDLC SERPL-MCNC: 122 MG/DL (ref ?–130)
OSMOLALITY SERPL CALC.SUM OF ELEC: 285 MOSM/KG (ref 275–295)
PATIENT FASTING Y/N/NP: YES
PATIENT FASTING Y/N/NP: YES
PH UR STRIP.AUTO: 5 [PH] (ref 4.5–8)
PLATELET # BLD AUTO: 197 10(3)UL (ref 150–450)
POTASSIUM SERPL-SCNC: 4.2 MMOL/L (ref 3.5–5.1)
PROT UR STRIP.AUTO-MCNC: 30 MG/DL
RBC # BLD AUTO: 5.62 X10(6)UL (ref 4.3–5.7)
RBC UR QL AUTO: NEGATIVE
SODIUM SERPL-SCNC: 135 MMOL/L (ref 136–145)
SP GR UR STRIP.AUTO: 1.03 (ref 1–1.03)
T4 FREE SERPL-MCNC: 0.9 NG/DL (ref 0.8–1.7)
TRIGL SERPL-MCNC: 114 MG/DL (ref 30–149)
TSI SER-ACNC: 3.77 MIU/ML (ref 0.36–3.74)
UROBILINOGEN UR STRIP.AUTO-MCNC: <2 MG/DL
VLDLC SERPL CALC-MCNC: 23 MG/DL (ref 0–30)
WBC # BLD AUTO: 7.1 X10(3) UL (ref 4–11)

## 2020-01-10 PROCEDURE — 87107 FUNGI IDENTIFICATION MOLD: CPT

## 2020-01-10 PROCEDURE — 82570 ASSAY OF URINE CREATININE: CPT

## 2020-01-10 PROCEDURE — 84439 ASSAY OF FREE THYROXINE: CPT

## 2020-01-10 PROCEDURE — 87101 SKIN FUNGI CULTURE: CPT

## 2020-01-10 PROCEDURE — 36415 COLL VENOUS BLD VENIPUNCTURE: CPT

## 2020-01-10 PROCEDURE — 84443 ASSAY THYROID STIM HORMONE: CPT

## 2020-01-10 PROCEDURE — 80061 LIPID PANEL: CPT

## 2020-01-10 PROCEDURE — 87206 SMEAR FLUORESCENT/ACID STAI: CPT

## 2020-01-10 PROCEDURE — 85025 COMPLETE CBC W/AUTO DIFF WBC: CPT

## 2020-01-10 PROCEDURE — 81001 URINALYSIS AUTO W/SCOPE: CPT

## 2020-01-10 PROCEDURE — 80053 COMPREHEN METABOLIC PANEL: CPT

## 2020-01-10 PROCEDURE — 82043 UR ALBUMIN QUANTITATIVE: CPT

## 2020-01-16 RX ORDER — GLIPIZIDE 5 MG/1
TABLET ORAL
Qty: 135 TABLET | Refills: 0 | Status: SHIPPED | OUTPATIENT
Start: 2020-01-16 | End: 2020-01-16

## 2020-01-16 RX ORDER — ASPIRIN 81 MG/1
TABLET, COATED ORAL
Qty: 30 TABLET | Refills: 0 | Status: SHIPPED | OUTPATIENT
Start: 2020-01-16 | End: 2020-03-03

## 2020-01-16 RX ORDER — GLIPIZIDE 5 MG/1
TABLET ORAL
Qty: 270 TABLET | Refills: 0 | Status: SHIPPED | OUTPATIENT
Start: 2020-01-16 | End: 2020-07-10

## 2020-01-16 NOTE — TELEPHONE ENCOUNTER
Protocol failed for Glipizide - Last HgBA1C < 7.5    Medication(s) to Refill:   Requested Prescriptions     Pending Prescriptions Disp Refills   • ASPIRIN LOW DOSE 81 MG Oral Tab EC [Pharmacy Med Name: ASPIRIN 81MG EC LOW DOSE  TABLETS] 30 tablet 0     Sig

## 2020-01-16 NOTE — TELEPHONE ENCOUNTER
GLIPIZIDE 5 MG Oral Tab    Last OV relevant to medication:  1-7-2020    Last refill date:     When pt was asked to return for OV: 4 weeks     Upcoming appt/reason: none    Labs: 1-

## 2020-02-04 ENCOUNTER — DIABETIC EDUCATION (OUTPATIENT)
Dept: ENDOCRINOLOGY CLINIC | Facility: CLINIC | Age: 50
End: 2020-02-04

## 2020-02-04 DIAGNOSIS — Z79.4 TYPE 2 DIABETES MELLITUS WITH COMPLICATION, WITH LONG-TERM CURRENT USE OF INSULIN (HCC): Primary | ICD-10-CM

## 2020-02-04 DIAGNOSIS — E11.8 TYPE 2 DIABETES MELLITUS WITH COMPLICATION, WITH LONG-TERM CURRENT USE OF INSULIN (HCC): Primary | ICD-10-CM

## 2020-02-05 NOTE — PROGRESS NOTES
Kim Brady  [id number] attended Step 2 Class: Pathophysiology of Diabetes, Types of Diabetes, Sources of Carbohydrate, Exercise, Blood Glucose Targets     Date: 2/5/2020  Referring Provider: Dr. Mervin Wilder  Start time: 5:00 End time: 7:00    The patient pa

## 2020-02-06 ENCOUNTER — OFFICE VISIT (OUTPATIENT)
Dept: INTERNAL MEDICINE CLINIC | Facility: CLINIC | Age: 50
End: 2020-02-06

## 2020-02-06 VITALS
BODY MASS INDEX: 34.74 KG/M2 | WEIGHT: 237.25 LBS | SYSTOLIC BLOOD PRESSURE: 138 MMHG | HEIGHT: 69.25 IN | OXYGEN SATURATION: 99 % | DIASTOLIC BLOOD PRESSURE: 94 MMHG | RESPIRATION RATE: 18 BRPM | TEMPERATURE: 99 F | HEART RATE: 127 BPM

## 2020-02-06 DIAGNOSIS — E11.21 DIABETIC NEPHROPATHY ASSOCIATED WITH TYPE 2 DIABETES MELLITUS (HCC): Primary | ICD-10-CM

## 2020-02-06 NOTE — PROGRESS NOTES
Madalyn Abel [facility] 1970 is a 44year old male.     Patient presents with:  Test Results      HPI:   For test results  Current Outpatient Medications   Medication Sig Dispense Refill   • Empagliflozin (JARDIANCE) 10 MG Oral Tab Take 1 tablet by mouth daily 18   Ht 69.25\"   Wt 237 lb 4 oz (107.6 kg)   SpO2 99%   BMI 34.78 kg/m²     na      ASSESSMENT AND PLAN:   Ramiro Jolly was seen today for test results.     Diagnoses and all orders for this visit:    Diabetic nephropathy associated with type 2 diabetes mellitus

## 2020-03-03 RX ORDER — ASPIRIN 81 MG/1
TABLET, COATED ORAL
Qty: 30 TABLET | Refills: 2 | Status: SHIPPED | OUTPATIENT
Start: 2020-03-03 | End: 2020-05-18

## 2020-03-03 NOTE — TELEPHONE ENCOUNTER
Aspirin 81 mg 1 tab daily filled 1/16/20 30 with 0 refills     LOV 2/6/20  Return in about 4 weeks (around 3/5/2020).   No upcoming apt on file   Labs 1/10/20

## 2020-03-20 DIAGNOSIS — I10 ESSENTIAL HYPERTENSION: Chronic | ICD-10-CM

## 2020-03-20 DIAGNOSIS — E78.00 HYPERCHOLESTEREMIA: Chronic | ICD-10-CM

## 2020-03-20 RX ORDER — LOSARTAN POTASSIUM 100 MG/1
TABLET ORAL
Qty: 90 TABLET | Refills: 1 | Status: SHIPPED | OUTPATIENT
Start: 2020-03-20 | End: 2020-10-01

## 2020-03-20 RX ORDER — ATORVASTATIN CALCIUM 20 MG/1
TABLET, FILM COATED ORAL
Qty: 90 TABLET | Refills: 0 | Status: SHIPPED | OUTPATIENT
Start: 2020-03-20 | End: 2020-07-29

## 2020-03-20 NOTE — TELEPHONE ENCOUNTER
LOSARTAN 100 MG     Last OV relevant to medication: 2-6-2020    Last refill date: 9- #90 tabs with 1 refills     When pt was asked to return for OV: 4 weeks    Upcoming appt/reason: none scheduled     Labs: 1-- lipid, cbc, cmp

## 2020-03-20 NOTE — TELEPHONE ENCOUNTER
Passed protocol    Requesting ATORVASTATIN 20 MG Oral Tab  LOV: 2/6/20  RTC: 4 weeks  Last Relevant Labs: 1/10/2020  Filled: 9/24/19 #90 with 0 refills    No future appointments.

## 2020-05-13 DIAGNOSIS — E11.65 TYPE 2 DIABETES MELLITUS WITH HYPERGLYCEMIA, WITHOUT LONG-TERM CURRENT USE OF INSULIN (HCC): Chronic | ICD-10-CM

## 2020-05-14 ENCOUNTER — TELEPHONE (OUTPATIENT)
Dept: INTERNAL MEDICINE CLINIC | Facility: CLINIC | Age: 50
End: 2020-05-14

## 2020-05-14 RX ORDER — TERBINAFINE HYDROCHLORIDE 250 MG/1
250 TABLET ORAL DAILY
Qty: 30 TABLET | Refills: 2 | Status: SHIPPED | OUTPATIENT
Start: 2020-05-14 | End: 2020-07-15

## 2020-05-14 NOTE — TELEPHONE ENCOUNTER
His antifungal medicines were sent to the pharmacy in [place] is this where he wants it sent please clarify

## 2020-05-14 NOTE — TELEPHONE ENCOUNTER
[facility] [address] [facility] [address], [phone], [phone]      Patient calling for Metformin refill he is out and asks for possible refill today?  Please advise

## 2020-05-14 NOTE — TELEPHONE ENCOUNTER
Patient would like medication sent to treat toe fungus sent to [facility] [address], [phone], [phone]

## 2020-05-14 NOTE — TELEPHONE ENCOUNTER
Metformin 1000 mg 1 tab bid filled 9-18-19 180 with 1 refill     LOV 2-6-20   Return in about 4 weeks (around 3/5/2020).   No upcoming apt on file   Labs 12-18-19   HEMOGLOBIN A1C  4.3 - 5.6 % 9.7Abnormal

## 2020-05-14 NOTE — TELEPHONE ENCOUNTER
Labs drawn 1/10/20  Fungus HSN Culture Result Trichophyton species        Ref Range & Units 1/10/20  9:29 AM   Glucose  70 - 99 mg/dL 173High     Sodium  136 - 145 mmol/L 135Low     Potassium  3.5 - 5.1 mmol/L 4.2    Chloride  98 - 112 mmol/L 104    CO2  2

## 2020-05-14 NOTE — TELEPHONE ENCOUNTER
Please let patient know medicines have been sent.   Patient needs to get blood work drawn those orders have been placed- he needs to start checking her sugar numbers and see me in a couple of weeks

## 2020-05-18 RX ORDER — ASPIRIN 81 MG/1
TABLET ORAL
Qty: 30 TABLET | Refills: 2 | Status: SHIPPED | OUTPATIENT
Start: 2020-05-18 | End: 2020-09-10

## 2020-05-18 NOTE — TELEPHONE ENCOUNTER
ASPIRIN 81MG EC LOW DOSE TABLETS    LOV: 2/6/2020   RTC: 4 weeks   FOV: none scheduled   Filled: 3/3/2020 #30, 2 refills

## 2020-07-10 ENCOUNTER — TELEPHONE (OUTPATIENT)
Dept: INTERNAL MEDICINE CLINIC | Facility: CLINIC | Age: 50
End: 2020-07-10

## 2020-07-11 RX ORDER — GLIPIZIDE 5 MG/1
5 TABLET ORAL
Qty: 270 TABLET | Refills: 0 | Status: SHIPPED | OUTPATIENT
Start: 2020-07-11 | End: 2020-07-29

## 2020-07-11 NOTE — TELEPHONE ENCOUNTER
[facility] pharmacy is calling to get clarification on the directions for glipiZIDE 5 MG Oral Tab. Please advise.

## 2020-07-11 NOTE — TELEPHONE ENCOUNTER
Refill requested:   Requested Prescriptions     Pending Prescriptions Disp Refills   • glipiZIDE 5 MG Oral Tab 270 tablet 0     Last refill:  1- # 270 tabs with 0 refills     Labs: 1--lipid, cbc, cmp    Blood Pressure: BP Readings from Last 3

## 2020-07-16 RX ORDER — TERBINAFINE HYDROCHLORIDE 250 MG/1
250 TABLET ORAL DAILY
Qty: 30 TABLET | Refills: 2 | Status: SHIPPED | OUTPATIENT
Start: 2020-07-16 | End: 2020-10-08

## 2020-07-16 NOTE — TELEPHONE ENCOUNTER
Patient calling because Eusebio Narayanan' office called and we need MV approval for this refill to [place].  Patient is out of medication tomorrow

## 2020-07-16 NOTE — TELEPHONE ENCOUNTER
Romel requesting refill for: Empagliflozin (JARDIANCE) 10 MG Oral Tab     [facility] [address] [place] [address] [address] [phone], [phone]

## 2020-07-16 NOTE — TELEPHONE ENCOUNTER
Last DM appt was    Has not followed up in DM center since that time- despite recommendation for close follow up due to poor DM control  Defer to PCP

## 2020-07-16 NOTE — TELEPHONE ENCOUNTER
Refill requested:   Requested Prescriptions     Pending Prescriptions Disp Refills   • Terbinafine HCl (LAMISIL) 250 MG Oral Tab 30 tablet 2     Sig: Take 1 tablet (250 mg total) by mouth daily.      Last refill: 5- # 30 tabs with 2 refills     Labs:

## 2020-07-29 ENCOUNTER — TELEMEDICINE (OUTPATIENT)
Dept: ENDOCRINOLOGY CLINIC | Facility: CLINIC | Age: 50
End: 2020-07-29

## 2020-07-29 DIAGNOSIS — E11.65 TYPE 2 DIABETES MELLITUS WITH HYPERGLYCEMIA, WITHOUT LONG-TERM CURRENT USE OF INSULIN (HCC): Primary | ICD-10-CM

## 2020-07-29 DIAGNOSIS — E78.00 HYPERCHOLESTEREMIA: Chronic | ICD-10-CM

## 2020-07-29 PROCEDURE — 95251 CONT GLUC MNTR ANALYSIS I&R: CPT | Performed by: NURSE PRACTITIONER

## 2020-07-29 PROCEDURE — 99214 OFFICE O/P EST MOD 30 MIN: CPT | Performed by: NURSE PRACTITIONER

## 2020-07-29 RX ORDER — GLIPIZIDE 5 MG/1
5 TABLET ORAL
Qty: 270 TABLET | Refills: 1 | Status: SHIPPED | OUTPATIENT
Start: 2020-07-29 | End: 2021-07-15

## 2020-07-29 RX ORDER — FLASH GLUCOSE SENSOR
KIT MISCELLANEOUS
Qty: 6 EACH | Refills: 2 | Status: SHIPPED | OUTPATIENT
Start: 2020-07-29

## 2020-07-29 RX ORDER — ATORVASTATIN CALCIUM 20 MG/1
20 TABLET, FILM COATED ORAL NIGHTLY
Qty: 90 TABLET | Refills: 1 | Status: SHIPPED | OUTPATIENT
Start: 2020-07-29 | End: 2021-03-23

## 2020-07-29 NOTE — PATIENT INSTRUCTIONS
It appears you have really improved your blood sugars based on the continuous glucose monitor report: 2 week average is 138 mg/dl       It is time to update your A1C at the lab.    No need to fast for this   The lab is now taking appointments (preferred not snack.    5. If you’re not sure what caused your low blood glucose, call your healthcare provider.     6. Always check your blood glucose before you drive

## 2020-07-29 NOTE — PROGRESS NOTES
Due to COVID-19 ACTION PLAN, the patient's office visit was converted to a video visit. Time Spent:  18 min     Yao Grover is a 44year old male who presents today to establish for diabetes management.    Primary care physician: Kt Ellis MD   Most 9.9 (H) 09/08/2018     (H) 03/16/2019       Lab Results   Component Value Date    CHOLEST 164 01/10/2020    TRIG 114 01/10/2020    HDL 42 01/10/2020    LDL 99 01/10/2020    MICROALBCREA 383.9 (H) 01/10/2020    CREATSERUM 1.10 01/10/2020    GFRNAA 77 Gluc Sensor (FREESTYLE XIMENA 14 DAY SENSOR) Does not apply Misc Apply 1 sensor to skin every 14 days 2 each 2   • Glucose Blood (ACCU-CHEK GEORGETTE PLUS) In Vitro Strip Tests 1 x daily Dx E11.65 100 strip 1   • Azelastine HCl 0.1 % Nasal Solution 1 spray by N have improved.    Reminded again importance of DM control and + impact on long term health    Continue:   Glipizide 5m.5 tabs twice daily   Metformin 1000mg twice daily   jardiance 10mg once daily   ~discussed changing CHICAS to GLP but will defer for now audio and/or video communication.   This has been done in good yandy to provide continuity of care in the best interest of the provider-patient relationship, due to the on-going public health crisis/national emergency and because of restrictions of visitati

## 2020-08-13 ENCOUNTER — APPOINTMENT (OUTPATIENT)
Dept: LAB | Age: 50
End: 2020-08-13
Attending: INTERNAL MEDICINE
Payer: COMMERCIAL

## 2020-08-13 DIAGNOSIS — E11.65 TYPE 2 DIABETES MELLITUS WITH HYPERGLYCEMIA, WITHOUT LONG-TERM CURRENT USE OF INSULIN (HCC): Chronic | ICD-10-CM

## 2020-08-13 LAB
ALBUMIN SERPL-MCNC: 3.8 G/DL (ref 3.4–5)
ALBUMIN/GLOB SERPL: 1.1 {RATIO} (ref 1–2)
ALP LIVER SERPL-CCNC: 86 U/L (ref 45–117)
ALT SERPL-CCNC: 24 U/L (ref 16–61)
ANION GAP SERPL CALC-SCNC: 6 MMOL/L (ref 0–18)
AST SERPL-CCNC: 17 U/L (ref 15–37)
BILIRUB SERPL-MCNC: 0.5 MG/DL (ref 0.1–2)
BUN BLD-MCNC: 14 MG/DL (ref 7–18)
BUN/CREAT SERPL: 15.6 (ref 10–20)
CALCIUM BLD-MCNC: 8.9 MG/DL (ref 8.5–10.1)
CHLORIDE SERPL-SCNC: 107 MMOL/L (ref 98–112)
CO2 SERPL-SCNC: 24 MMOL/L (ref 21–32)
CREAT BLD-MCNC: 0.9 MG/DL (ref 0.7–1.3)
CREAT UR-SCNC: 74.5 MG/DL
EST. AVERAGE GLUCOSE BLD GHB EST-MCNC: 171 MG/DL (ref 68–126)
GLOBULIN PLAS-MCNC: 3.6 G/DL (ref 2.8–4.4)
GLUCOSE BLD-MCNC: 159 MG/DL (ref 70–99)
HBA1C MFR BLD HPLC: 7.6 % (ref ?–5.7)
M PROTEIN MFR SERPL ELPH: 7.4 G/DL (ref 6.4–8.2)
MICROALBUMIN UR-MCNC: 29.2 MG/DL
MICROALBUMIN/CREAT 24H UR-RTO: 391.9 UG/MG (ref ?–30)
OSMOLALITY SERPL CALC.SUM OF ELEC: 288 MOSM/KG (ref 275–295)
PATIENT FASTING Y/N/NP: YES
POTASSIUM SERPL-SCNC: 4.5 MMOL/L (ref 3.5–5.1)
SODIUM SERPL-SCNC: 137 MMOL/L (ref 136–145)

## 2020-08-13 PROCEDURE — 36415 COLL VENOUS BLD VENIPUNCTURE: CPT

## 2020-08-13 PROCEDURE — 82570 ASSAY OF URINE CREATININE: CPT

## 2020-08-13 PROCEDURE — 82043 UR ALBUMIN QUANTITATIVE: CPT

## 2020-08-13 PROCEDURE — 83036 HEMOGLOBIN GLYCOSYLATED A1C: CPT

## 2020-08-13 PROCEDURE — 80053 COMPREHEN METABOLIC PANEL: CPT

## 2020-09-10 ENCOUNTER — TELEMEDICINE (OUTPATIENT)
Dept: INTERNAL MEDICINE CLINIC | Facility: CLINIC | Age: 50
End: 2020-09-10

## 2020-09-10 DIAGNOSIS — E78.00 HYPERCHOLESTEREMIA: Primary | Chronic | ICD-10-CM

## 2020-09-10 DIAGNOSIS — E11.21 DIABETIC NEPHROPATHY ASSOCIATED WITH TYPE 2 DIABETES MELLITUS (HCC): Chronic | ICD-10-CM

## 2020-09-10 DIAGNOSIS — R80.1 PERSISTENT PROTEINURIA: ICD-10-CM

## 2020-09-10 DIAGNOSIS — Z00.00 LABORATORY EXAMINATION ORDERED AS PART OF A ROUTINE GENERAL MEDICAL EXAMINATION: ICD-10-CM

## 2020-09-10 PROCEDURE — 99213 OFFICE O/P EST LOW 20 MIN: CPT | Performed by: INTERNAL MEDICINE

## 2020-09-10 RX ORDER — ASPIRIN 81 MG/1
TABLET, COATED ORAL
Qty: 90 TABLET | Refills: 1 | Status: SHIPPED | OUTPATIENT
Start: 2020-09-10 | End: 2021-03-23

## 2020-09-10 NOTE — PROGRESS NOTES
Virtual Telephone Check-In    Lyndle Skiff verbally declines a Virtual/Telephone Check-In visit on 09/10/20. Patient has been referred to the [facility] website at [email or web address] to review the yearly Consent to Treat document.     Patient understands and

## 2020-09-14 ENCOUNTER — TELEPHONE (OUTPATIENT)
Dept: ENDOCRINOLOGY CLINIC | Facility: CLINIC | Age: 50
End: 2020-09-14

## 2020-09-14 DIAGNOSIS — E11.65 TYPE 2 DIABETES MELLITUS WITH HYPERGLYCEMIA, WITHOUT LONG-TERM CURRENT USE OF INSULIN (HCC): Primary | ICD-10-CM

## 2020-09-14 NOTE — TELEPHONE ENCOUNTER
IHP patient w A1C over 8%   Pt is due for DM appt   Please let him know I am available in [address]

## 2020-09-22 NOTE — TELEPHONE ENCOUNTER
2nd attempt to contact patient to schedule f/u with Morena Harvey in the [facility] office. LMTCB to schedule.

## 2020-09-30 DIAGNOSIS — I10 ESSENTIAL HYPERTENSION: Chronic | ICD-10-CM

## 2020-09-30 RX ORDER — LOSARTAN POTASSIUM 100 MG/1
100 TABLET ORAL DAILY
Qty: 90 TABLET | Refills: 1 | Status: CANCELLED | OUTPATIENT
Start: 2020-09-30

## 2020-10-01 RX ORDER — LOSARTAN POTASSIUM 100 MG/1
TABLET ORAL
Qty: 90 TABLET | Refills: 1 | Status: SHIPPED | OUTPATIENT
Start: 2020-10-01 | End: 2021-03-23

## 2020-10-01 NOTE — TELEPHONE ENCOUNTER
Failed protocol     Last refill:  3/20/2020 Losartan 100 mg #90 1R    LOV:   [facility]10[facility]  No FOV scheduled
160

## 2020-10-15 ENCOUNTER — TELEPHONE (OUTPATIENT)
Dept: CASE MANAGEMENT | Age: 50
End: 2020-10-15

## 2020-10-21 ENCOUNTER — OFFICE VISIT (OUTPATIENT)
Dept: ENDOCRINOLOGY CLINIC | Facility: CLINIC | Age: 50
End: 2020-10-21

## 2020-10-21 VITALS
HEART RATE: 90 BPM | HEIGHT: 69.25 IN | BODY MASS INDEX: 34.12 KG/M2 | OXYGEN SATURATION: 100 % | DIASTOLIC BLOOD PRESSURE: 80 MMHG | SYSTOLIC BLOOD PRESSURE: 120 MMHG | WEIGHT: 233 LBS

## 2020-10-21 DIAGNOSIS — E11.65 TYPE 2 DIABETES MELLITUS WITH HYPERGLYCEMIA, WITHOUT LONG-TERM CURRENT USE OF INSULIN (HCC): Primary | ICD-10-CM

## 2020-10-21 PROCEDURE — 99214 OFFICE O/P EST MOD 30 MIN: CPT | Performed by: NURSE PRACTITIONER

## 2020-10-21 PROCEDURE — 3079F DIAST BP 80-89 MM HG: CPT | Performed by: NURSE PRACTITIONER

## 2020-10-21 PROCEDURE — 3074F SYST BP LT 130 MM HG: CPT | Performed by: NURSE PRACTITIONER

## 2020-10-21 PROCEDURE — 83036 HEMOGLOBIN GLYCOSYLATED A1C: CPT | Performed by: NURSE PRACTITIONER

## 2020-10-21 PROCEDURE — 3008F BODY MASS INDEX DOCD: CPT | Performed by: NURSE PRACTITIONER

## 2020-10-21 PROCEDURE — 95251 CONT GLUC MNTR ANALYSIS I&R: CPT | Performed by: NURSE PRACTITIONER

## 2020-10-21 RX ORDER — EMPAGLIFLOZIN 10 MG/1
1 TABLET, FILM COATED ORAL DAILY
COMMUNITY
Start: 2020-10-09 | End: 2021-03-23

## 2020-10-21 NOTE — PROGRESS NOTES
Bessie Scales is a 44year old male who presents today  for diabetes management. Primary care physician: Jonatan Sierra MD   In the past 3m DM control has improved to 7.3%  (last A1C 7.6%)   He is c/o skin irritation after 14 d sensor is removed.  Does not (H) 03/16/2019     (H) 08/13/2020       Lab Results   Component Value Date    CHOLEST 164 01/10/2020    TRIG 114 01/10/2020    HDL 42 01/10/2020    LDL 99 01/10/2020    MICROALBCREA 391.9 (H) 08/13/2020    CREATSERUM 0.90 08/13/2020    GFRNAA 99 08/ HCl 0.1 % Nasal Solution 1 spray by Nasal route 2 (two) times daily. 30 mL 0   • Dorzolamide HCl-Timolol Mal 22.3-6.8 MG/ML Ophthalmic Solution INT 1 GTT IN EACH EYE BID  6   • latanoprost 0.005 % Ophthalmic Solution Place 1 drop into both eyes nightly. Assessment/Plan:    Type 2 diabetes mellitus with hyperglycemia (HCC)  A1C: 7.3% (last A1C 7.6%)   Weight : 233 lb   Reminded again importance of DM control and + impact on long term health  Decrease glipizide 7.5mg ---> 5mg in AM , continue 7.5mg in Nephropathy screenin +++urine m/alb  continue ace /arb rx. LIPID screening: labs  atorvastatin rx. Last dilated eye exam: No data recorded Exam shows retinopathy?  No data recorded  Last diabetic foot exam: No data recorded  Date of las

## 2020-10-21 NOTE — PATIENT INSTRUCTIONS
Your trends are better     For the skin reaction, we could try a skin barrier   Use the barrier wipe over the region you will wear the sensor to see if skin reaction improves   If not we will resume finger stick  With meter /test strips.      A1C 7.3% (last

## 2020-10-23 ENCOUNTER — LAB ENCOUNTER (OUTPATIENT)
Dept: LAB | Age: 50
End: 2020-10-23
Attending: INTERNAL MEDICINE
Payer: COMMERCIAL

## 2020-10-23 DIAGNOSIS — E11.21 DIABETIC NEPHROPATHY ASSOCIATED WITH TYPE 2 DIABETES MELLITUS (HCC): ICD-10-CM

## 2020-10-23 DIAGNOSIS — E78.00 HYPERCHOLESTEREMIA: Chronic | ICD-10-CM

## 2020-10-23 DIAGNOSIS — Z00.00 LABORATORY EXAMINATION ORDERED AS PART OF A ROUTINE GENERAL MEDICAL EXAMINATION: ICD-10-CM

## 2020-10-23 PROCEDURE — 84436 ASSAY OF TOTAL THYROXINE: CPT

## 2020-10-23 PROCEDURE — 83883 ASSAY NEPHELOMETRY NOT SPEC: CPT

## 2020-10-23 PROCEDURE — 84153 ASSAY OF PSA TOTAL: CPT

## 2020-10-23 PROCEDURE — 86334 IMMUNOFIX E-PHORESIS SERUM: CPT

## 2020-10-23 PROCEDURE — 82570 ASSAY OF URINE CREATININE: CPT

## 2020-10-23 PROCEDURE — 80053 COMPREHEN METABOLIC PANEL: CPT

## 2020-10-23 PROCEDURE — 84165 PROTEIN E-PHORESIS SERUM: CPT

## 2020-10-23 PROCEDURE — 85025 COMPLETE CBC W/AUTO DIFF WBC: CPT

## 2020-10-23 PROCEDURE — 82043 UR ALBUMIN QUANTITATIVE: CPT

## 2020-10-23 PROCEDURE — 84443 ASSAY THYROID STIM HORMONE: CPT

## 2020-10-23 PROCEDURE — 36415 COLL VENOUS BLD VENIPUNCTURE: CPT

## 2020-10-23 PROCEDURE — 81001 URINALYSIS AUTO W/SCOPE: CPT

## 2020-10-23 PROCEDURE — 80061 LIPID PANEL: CPT

## 2020-10-27 ENCOUNTER — TELEPHONE (OUTPATIENT)
Dept: INTERNAL MEDICINE CLINIC | Facility: CLINIC | Age: 50
End: 2020-10-27

## 2020-10-29 ENCOUNTER — TELEPHONE (OUTPATIENT)
Dept: CASE MANAGEMENT | Age: 50
End: 2020-10-29

## 2020-10-29 NOTE — TELEPHONE ENCOUNTER
Patient due for DM eye exam, referral from 9/10/20 is in file. Left message to call back [phone].

## 2020-11-03 ENCOUNTER — TELEMEDICINE (OUTPATIENT)
Dept: INTERNAL MEDICINE CLINIC | Facility: CLINIC | Age: 50
End: 2020-11-03

## 2020-11-03 DIAGNOSIS — I10 ESSENTIAL HYPERTENSION: Chronic | ICD-10-CM

## 2020-11-03 DIAGNOSIS — R80.8 OTHER PROTEINURIA: ICD-10-CM

## 2020-11-03 DIAGNOSIS — E11.21 DIABETIC NEPHROPATHY ASSOCIATED WITH TYPE 2 DIABETES MELLITUS (HCC): Primary | Chronic | ICD-10-CM

## 2020-11-03 PROCEDURE — 99213 OFFICE O/P EST LOW 20 MIN: CPT | Performed by: INTERNAL MEDICINE

## 2020-11-03 NOTE — PROGRESS NOTES
Virtual Telephone Check-In    Noreen Penny verbally consents to a Virtual/Telephone Check-In visit on 11/03/20. Patient has been referred to the [facility] website at [email or web address] to review the yearly Consent to Treat document.     Patient understands

## 2020-11-12 ENCOUNTER — TELEPHONE (OUTPATIENT)
Dept: INTERNAL MEDICINE CLINIC | Facility: CLINIC | Age: 50
End: 2020-11-12

## 2020-11-24 ENCOUNTER — TELEPHONE (OUTPATIENT)
Dept: INTERNAL MEDICINE CLINIC | Facility: CLINIC | Age: 50
End: 2020-11-24

## 2020-11-24 DIAGNOSIS — E11.65 TYPE 2 DIABETES MELLITUS WITH HYPERGLYCEMIA, WITHOUT LONG-TERM CURRENT USE OF INSULIN (HCC): Primary | ICD-10-CM

## 2020-11-24 NOTE — TELEPHONE ENCOUNTER
Pt would like to request additional visits to see the Cordell Lyn MD pr has apt tomorrow at 9:15 tomorrow and he said he called to referrals and told him if they send it in right away it will be ready

## 2020-11-24 NOTE — TELEPHONE ENCOUNTER
Referral entered but is pending approval through insurance. Pt notified and verbalized understanding.

## 2021-02-02 ENCOUNTER — TELEPHONE (OUTPATIENT)
Dept: INTERNAL MEDICINE CLINIC | Facility: CLINIC | Age: 51
End: 2021-02-02

## 2021-02-03 ENCOUNTER — OFFICE VISIT (OUTPATIENT)
Dept: NEPHROLOGY | Facility: CLINIC | Age: 51
End: 2021-02-03

## 2021-02-03 VITALS — SYSTOLIC BLOOD PRESSURE: 122 MMHG | BODY MASS INDEX: 34 KG/M2 | DIASTOLIC BLOOD PRESSURE: 84 MMHG | WEIGHT: 231.81 LBS

## 2021-02-03 DIAGNOSIS — R80.9 MICROALBUMINURIA: ICD-10-CM

## 2021-02-03 DIAGNOSIS — R80.9 PROTEINURIA, UNSPECIFIED TYPE: Primary | ICD-10-CM

## 2021-02-03 PROCEDURE — 99244 OFF/OP CNSLTJ NEW/EST MOD 40: CPT | Performed by: INTERNAL MEDICINE

## 2021-02-03 PROCEDURE — 3079F DIAST BP 80-89 MM HG: CPT | Performed by: INTERNAL MEDICINE

## 2021-02-03 PROCEDURE — 3074F SYST BP LT 130 MM HG: CPT | Performed by: INTERNAL MEDICINE

## 2021-02-03 RX ORDER — TERBINAFINE HYDROCHLORIDE 250 MG/1
TABLET ORAL
COMMUNITY
Start: 2020-12-16 | End: 2021-03-08

## 2021-02-03 NOTE — PROGRESS NOTES
Nephrology Consult Note    REASON FOR CONSULT: Proteinuria    ASSESSMENT/PLAN:        1) Proteinuria- pt has developed mild, subnephrotic range proteinuria over the last several years which has improved in the past 12 months and is now in the microalbuminu failure gross microscopic hematuria kidney stones or infections. No history of cardiac pulmonary or hepatic disease. Does not take NSAIDs. No family history of kidney disease. Otherwise please see above.     ROS:    Denies fever/chills  Denies wt loss/g differently: 2 sprays by Each Nare route daily as needed.  ) 1 Bottle 3       Allergies:    Seasonal                    Social History:  Social History    Socioeconomic History      Marital status: Single      Spouse name: Not on file      Number of childr

## 2021-03-08 RX ORDER — BLOOD SUGAR DIAGNOSTIC
STRIP MISCELLANEOUS
Qty: 100 STRIP | Refills: 1 | Status: SHIPPED | OUTPATIENT
Start: 2021-03-08

## 2021-03-08 RX ORDER — TERBINAFINE HYDROCHLORIDE 250 MG/1
TABLET ORAL
Qty: 30 TABLET | Refills: 0 | Status: SHIPPED | OUTPATIENT
Start: 2021-03-08 | End: 2021-09-27

## 2021-03-08 NOTE — TELEPHONE ENCOUNTER
Medication(s) to Refill:   Requested Prescriptions     Pending Prescriptions Disp Refills   • TERBINAFINE  MG Oral Tab [Pharmacy Med Name: TERBINAFINE 250MG TABLETS] 30 tablet 0     Sig: TAKE 1 TABLET(250 MG) BY MOUTH DAILY   • Glucose Blood (ACCU-C

## 2021-03-08 NOTE — TELEPHONE ENCOUNTER
No protocol   Terbinafine hcl 250mg filled 12/16/2020  #30 0 refills     Protocol failed   Accu-chek britney plus strip filled 9/24/2019 #100 1 refill     LOV: 11/3/2020   RTC: none noted

## 2021-03-22 DIAGNOSIS — I10 ESSENTIAL HYPERTENSION: Chronic | ICD-10-CM

## 2021-03-22 DIAGNOSIS — E78.00 HYPERCHOLESTEREMIA: Chronic | ICD-10-CM

## 2021-03-23 RX ORDER — ASPIRIN 81 MG/1
TABLET, COATED ORAL
Qty: 90 TABLET | Refills: 1 | Status: SHIPPED | OUTPATIENT
Start: 2021-03-23 | End: 2021-10-13

## 2021-03-23 RX ORDER — LOSARTAN POTASSIUM 100 MG/1
TABLET ORAL
Qty: 90 TABLET | Refills: 1 | Status: SHIPPED | OUTPATIENT
Start: 2021-03-23 | End: 2021-10-13

## 2021-03-23 RX ORDER — EMPAGLIFLOZIN 10 MG/1
1 TABLET, FILM COATED ORAL DAILY
Qty: 90 TABLET | Refills: 0 | Status: SHIPPED | OUTPATIENT
Start: 2021-03-23 | End: 2021-07-15

## 2021-03-23 RX ORDER — ATORVASTATIN CALCIUM 20 MG/1
TABLET, FILM COATED ORAL
Qty: 90 TABLET | Refills: 1 | Status: SHIPPED | OUTPATIENT
Start: 2021-03-23 | End: 2021-10-12

## 2021-03-23 NOTE — TELEPHONE ENCOUNTER
Medication(s) to Refill:   Requested Prescriptions     Pending Prescriptions Disp Refills   • LOSARTAN 100 MG Oral Tab [Pharmacy Med Name: LOSARTAN 100MG TABLETS] 90 tablet 1     Sig: TAKE 1 TABLET BY MOUTH DAILY   • ASPIRIN LOW DOSE 81 MG Oral Tab EC [Pha

## 2021-07-08 DIAGNOSIS — E11.65 TYPE 2 DIABETES MELLITUS WITH HYPERGLYCEMIA, WITHOUT LONG-TERM CURRENT USE OF INSULIN (HCC): ICD-10-CM

## 2021-07-08 NOTE — TELEPHONE ENCOUNTER
Patient scheduled appointment.  Requesting refill until then    Future Appointments   Date Time Provider Ion Maritza   8/3/2021  3:00 PM Shivani Iraheta MD [facility] [place]

## 2021-07-08 NOTE — TELEPHONE ENCOUNTER
Lm for pt to return call. Pt is due for Px and labs.    Next opening is august 3 or after    Metformin HCl 1000 mg failed protocol due to  Diabetic Medication Protocol [id number]/2021 03:13 PM   HgBA1C procedure resulted in past 6 months Protocol Details

## 2021-07-15 RX ORDER — EMPAGLIFLOZIN 10 MG/1
TABLET, FILM COATED ORAL DAILY
Qty: 30 TABLET | Refills: 0 | Status: SHIPPED | OUTPATIENT
Start: 2021-07-15 | End: 2021-08-23

## 2021-07-15 RX ORDER — GLIPIZIDE 5 MG/1
TABLET ORAL
Qty: 90 TABLET | Refills: 0 | Status: SHIPPED | OUTPATIENT
Start: 2021-07-15 | End: 2021-09-10

## 2021-07-15 NOTE — TELEPHONE ENCOUNTER
Pt called and stated taking Glipizide 5mg  twice daily and taking Jardiance 10mg once daily     Schedule F/U with CAB 10/06/21

## 2021-07-16 ENCOUNTER — TELEPHONE (OUTPATIENT)
Dept: ENDOCRINOLOGY CLINIC | Facility: CLINIC | Age: 51
End: 2021-07-16

## 2021-07-16 RX ORDER — GLIPIZIDE 5 MG/1
TABLET ORAL
Qty: 270 TABLET | Refills: 0 | OUTPATIENT
Start: 2021-07-16

## 2021-07-16 NOTE — TELEPHONE ENCOUNTER
I apologize, I believe it was the jardiance at $150 a month and the glipizide was $16.54. Pt checking with pharmacy and insurance for benefit.

## 2021-07-16 NOTE — TELEPHONE ENCOUNTER
Pt called in asking why we denied glipizide (bc we sent it yesterday). Contacted pharmacy to make sure they received script yesterday. They did. Per pharmacist, pt now has a \"government plan\" so can't use the copay card. Cost would be $150 per month.

## 2021-07-16 NOTE — TELEPHONE ENCOUNTER
Will deny, script was sent yesterday.     Future Appointments   Date Time Provider Ion Maritza   8/3/2021  3:00 PM Shivani Iraheta MD [facility] [place]   10/6/2021  9:00 AM Christopher Barrett, APRN [id number] [place]

## 2021-08-05 DIAGNOSIS — E11.65 TYPE 2 DIABETES MELLITUS WITH HYPERGLYCEMIA, WITHOUT LONG-TERM CURRENT USE OF INSULIN (HCC): ICD-10-CM

## 2021-08-06 NOTE — TELEPHONE ENCOUNTER
Medication(s) to Refill:   Requested Prescriptions     Pending Prescriptions Disp Refills   • METFORMIN HCL 1000 MG Oral Tab [Pharmacy Med Name: METFORMIN 1000MG TABLETS] 60 tablet 0     Sig: TAKE 1 TABLET BY MOUTH TWICE DAILY WITH MEALS       LOV: 11-3-20

## 2021-08-23 NOTE — TELEPHONE ENCOUNTER
Requested Prescriptions     Pending Prescriptions Disp Refills   • JARDIANCE 10 MG Oral Tab [Pharmacy Med Name: Danna Fitzgerald 10MG TABLETS] 30 tablet 0     Sig: TAKE 1 TABLET BY MOUTH DAILY     FILLED- 07/15/21  LOV- 10/21/20  FOV- 10/06/21  LAST A1C- 7.3 (10/ Dorsal Nasal Flap Text: The defect edges were debeveled with a #15 scalpel blade.  Given the location of the defect and the proximity to free margins a dorsal nasal flap was deemed most appropriate.  Using a sterile surgical marker, an appropriate dorsal nasal flap was drawn around the defect.    The area thus outlined was incised deep to adipose tissue with a #15 scalpel blade.  The skin margins were undermined to an appropriate distance in all directions utilizing iris scissors.

## 2021-08-23 NOTE — TELEPHONE ENCOUNTER
Refill for jardiance approved until appt  If he cancels or no shows the follow up appt , no more refills.  Defer back to PCP

## 2021-09-10 RX ORDER — GLIPIZIDE 5 MG/1
TABLET ORAL
Qty: 90 TABLET | Refills: 0 | Status: SHIPPED | OUTPATIENT
Start: 2021-09-10 | End: 2021-10-06

## 2021-09-10 NOTE — TELEPHONE ENCOUNTER
Requested Prescriptions     Pending Prescriptions Disp Refills   • GLIPIZIDE 5 MG Oral Tab [Pharmacy Med Name: GLIPIZIDE 5MG TABLETS] 90 tablet 0     Sig: TAKE 1 AND 1/2 TABLETS BY MOUTH TWICE DAILY BEFORE MEALS     FILLED- 07/15/21  LOV- 10/21/20  [facility]- 10

## 2021-09-20 DIAGNOSIS — E11.65 TYPE 2 DIABETES MELLITUS WITH HYPERGLYCEMIA, WITHOUT LONG-TERM CURRENT USE OF INSULIN (HCC): ICD-10-CM

## 2021-09-21 DIAGNOSIS — E11.65 TYPE 2 DIABETES MELLITUS WITH HYPERGLYCEMIA, WITHOUT LONG-TERM CURRENT USE OF INSULIN (HCC): ICD-10-CM

## 2021-09-21 NOTE — TELEPHONE ENCOUNTER
Protocol failed     Requesting: metformin 1000mg     LOV: 11/3/20   RTC: none noted   Filled: 8/6/21 # 60 0 refills   Recent Labs: 10/23/20     Upcoming OV: 9/27/2021

## 2021-09-22 NOTE — TELEPHONE ENCOUNTER
metFORMIN HCl 1000 MG Oral Tab 60 tablet 0 9/21/2021    Sig:   Take 1 tablet (1,000 mg total) by mouth 2 (two) times daily with meals.

## 2021-09-27 ENCOUNTER — OFFICE VISIT (OUTPATIENT)
Dept: INTERNAL MEDICINE CLINIC | Facility: CLINIC | Age: 51
End: 2021-09-27
Payer: COMMERCIAL

## 2021-09-27 VITALS
HEART RATE: 96 BPM | BODY MASS INDEX: 33.2 KG/M2 | SYSTOLIC BLOOD PRESSURE: 146 MMHG | RESPIRATION RATE: 16 BRPM | WEIGHT: 224.19 LBS | OXYGEN SATURATION: 98 % | TEMPERATURE: 98 F | HEIGHT: 69 IN | DIASTOLIC BLOOD PRESSURE: 96 MMHG

## 2021-09-27 DIAGNOSIS — E11.21 DIABETIC NEPHROPATHY ASSOCIATED WITH TYPE 2 DIABETES MELLITUS (HCC): ICD-10-CM

## 2021-09-27 DIAGNOSIS — M65.342 TRIGGER RING FINGER OF LEFT HAND: ICD-10-CM

## 2021-09-27 DIAGNOSIS — Z00.00 ROUTINE GENERAL MEDICAL EXAMINATION AT A HEALTH CARE FACILITY: Primary | ICD-10-CM

## 2021-09-27 DIAGNOSIS — I10 ESSENTIAL HYPERTENSION: ICD-10-CM

## 2021-09-27 DIAGNOSIS — E78.00 HYPERCHOLESTEREMIA: ICD-10-CM

## 2021-09-27 PROCEDURE — 3008F BODY MASS INDEX DOCD: CPT | Performed by: INTERNAL MEDICINE

## 2021-09-27 PROCEDURE — 99396 PREV VISIT EST AGE 40-64: CPT | Performed by: INTERNAL MEDICINE

## 2021-09-27 PROCEDURE — 3077F SYST BP >= 140 MM HG: CPT | Performed by: INTERNAL MEDICINE

## 2021-09-27 PROCEDURE — 90471 IMMUNIZATION ADMIN: CPT | Performed by: INTERNAL MEDICINE

## 2021-09-27 PROCEDURE — 90686 IIV4 VACC NO PRSV 0.5 ML IM: CPT | Performed by: INTERNAL MEDICINE

## 2021-09-27 PROCEDURE — 3080F DIAST BP >= 90 MM HG: CPT | Performed by: INTERNAL MEDICINE

## 2021-09-27 PROCEDURE — 93000 ELECTROCARDIOGRAM COMPLETE: CPT | Performed by: INTERNAL MEDICINE

## 2021-09-27 NOTE — PROGRESS NOTES
Noreen Penny [facility] 1970 is a 44year old male.   Patient presents with:  Physical      HPI:   complete physical  Current Outpatient Medications   Medication Sig Dispense Refill   • metFORMIN HCl 1000 MG Oral Tab Take 1 tablet (1,000 mg total) by mouth 2 ( activities, good exercise tolerance, good general state of health, no fatigue, no fever, no weakness . HEENT/Neck:   Head no dizziness, no lightheadedness. Eyes does have glaucoma.  Does see Dr. Melanie Judd no redness , no drainage.  Ears no earaches, no ful of dystrophic right great toenail  Neurologic:   Patient denies dizziness, fainting, headache, loss of consciousness, memory loss, seizures, paresthesias, weakness. Tingling/numbness none. Trouble with balance none.    Psychiatric:   Patient denies anxiety, finger  NEUROLOGICAL:   Cognitive function  Mood /affect -thought :perception :normal  Appearance-orientation normal  Thought normal   Babinski: negative. .   Cerebellar Testing grossly/intact: yes. .   Cranial Nerves: CN's II-XII grossly intact.    Gait: nor finger or thumb. Causes  Repeated use of a tool with strong gripping, such as a drill or wrench, can irritate and inflame the tendons and the synovium.  It is also more common in certain medical conditions, such as rheumatoid arthritis, gout, and diabete with you about your options. Nonsurgical treatment  For mild symptoms, your healthcare provider may have you rest the finger or thumb. You may also be told to take anti-inflammatory medicines. These include ibuprofen or aspirin.  You may be given an inject

## 2021-09-30 ENCOUNTER — LAB ENCOUNTER (OUTPATIENT)
Dept: LAB | Age: 51
End: 2021-09-30
Attending: INTERNAL MEDICINE
Payer: COMMERCIAL

## 2021-09-30 DIAGNOSIS — E11.21 DIABETIC NEPHROPATHY ASSOCIATED WITH TYPE 2 DIABETES MELLITUS (HCC): ICD-10-CM

## 2021-09-30 DIAGNOSIS — E78.00 HYPERCHOLESTEREMIA: ICD-10-CM

## 2021-09-30 DIAGNOSIS — Z00.00 ROUTINE GENERAL MEDICAL EXAMINATION AT A HEALTH CARE FACILITY: ICD-10-CM

## 2021-09-30 PROCEDURE — 81001 URINALYSIS AUTO W/SCOPE: CPT | Performed by: INTERNAL MEDICINE

## 2021-09-30 PROCEDURE — 82570 ASSAY OF URINE CREATININE: CPT | Performed by: INTERNAL MEDICINE

## 2021-09-30 PROCEDURE — 84436 ASSAY OF TOTAL THYROXINE: CPT | Performed by: INTERNAL MEDICINE

## 2021-09-30 PROCEDURE — 84153 ASSAY OF PSA TOTAL: CPT | Performed by: INTERNAL MEDICINE

## 2021-09-30 PROCEDURE — 82043 UR ALBUMIN QUANTITATIVE: CPT | Performed by: INTERNAL MEDICINE

## 2021-09-30 PROCEDURE — 80050 GENERAL HEALTH PANEL: CPT | Performed by: INTERNAL MEDICINE

## 2021-09-30 PROCEDURE — 83036 HEMOGLOBIN GLYCOSYLATED A1C: CPT | Performed by: INTERNAL MEDICINE

## 2021-09-30 PROCEDURE — 80061 LIPID PANEL: CPT | Performed by: INTERNAL MEDICINE

## 2021-10-06 ENCOUNTER — OFFICE VISIT (OUTPATIENT)
Dept: ENDOCRINOLOGY CLINIC | Facility: CLINIC | Age: 51
End: 2021-10-06
Payer: COMMERCIAL

## 2021-10-06 VITALS
HEART RATE: 80 BPM | SYSTOLIC BLOOD PRESSURE: 120 MMHG | WEIGHT: 224.19 LBS | BODY MASS INDEX: 33 KG/M2 | OXYGEN SATURATION: 98 % | DIASTOLIC BLOOD PRESSURE: 74 MMHG

## 2021-10-06 DIAGNOSIS — E11.65 TYPE 2 DIABETES MELLITUS WITH HYPERGLYCEMIA, WITHOUT LONG-TERM CURRENT USE OF INSULIN (HCC): Primary | ICD-10-CM

## 2021-10-06 DIAGNOSIS — I10 ESSENTIAL HYPERTENSION: ICD-10-CM

## 2021-10-06 DIAGNOSIS — E78.00 HYPERCHOLESTEREMIA: ICD-10-CM

## 2021-10-06 PROCEDURE — 95251 CONT GLUC MNTR ANALYSIS I&R: CPT | Performed by: NURSE PRACTITIONER

## 2021-10-06 PROCEDURE — 3078F DIAST BP <80 MM HG: CPT | Performed by: NURSE PRACTITIONER

## 2021-10-06 PROCEDURE — 3074F SYST BP LT 130 MM HG: CPT | Performed by: NURSE PRACTITIONER

## 2021-10-06 PROCEDURE — 99214 OFFICE O/P EST MOD 30 MIN: CPT | Performed by: NURSE PRACTITIONER

## 2021-10-06 RX ORDER — GLIPIZIDE 5 MG/1
TABLET ORAL
Qty: 180 TABLET | Refills: 1 | Status: SHIPPED | OUTPATIENT
Start: 2021-10-06

## 2021-10-06 NOTE — PROGRESS NOTES
Patient presents with:  Diabetes: pt follow up William Toledo is a 44year old male who presents today for diabetes management.    Primary care physician: Cristal Finn MD   Last appt with Diabetes center:      In the past 3m his d MICROALBCREA 191.8 (H) 09/30/2021    CREATSERUM 0.85 09/30/2021    GFRNAA 100 09/30/2021    GFRAA 116 09/30/2021    AST 18 09/30/2021    ALT 37 09/30/2021           Component      Latest Ref Rng & Units 10/23/2020   MALB/CRE CALC      <=30.0 ug/mg 274.1 (H Ophthalmic Solution Place 1 drop into both eyes nightly. • Fluticasone Propionate 50 MCG/ACT Nasal Suspension 2 sprays by Each Nare route daily.  (Patient taking differently: 2 sprays by Each Nare route daily as needed.) 1 Bottle 3   • Glucose Blood (AC to call DM center . The patient is asked to return in 3 m  but recommended to contact DM clinic sooner if questions or concerns. The patient indicates understanding of these issues and agrees to the plan.       Orders Placed This Encounter      Interp benefits of my recommendations, as well as other treatment options were discussed with the patient today. questions were also answered to the best of my knowledge.

## 2021-10-06 NOTE — PATIENT INSTRUCTIONS
We are here to help you manage your diabetes.  Please continue with your primary care physician/provider for your routine health care maintenance     Your A1C: 7.7% (last A1C 7.3%)   This is slightly higher for you and we will continue to work together on l Nutrition includes:      1. Eat regular meals (breakfast, lunch, dinner) about every 4-5 hours  2. Eat a balanced plate with protein and produce at all meals: 1/4 plate- protein, 1/2 plate non starchy veggie preferred over fruit  3.  Water with all meals  4 care, patients receiving routine medications need to be seen at least twice per year however if the A1C is above 8% you will be need to be seen more frequently. · Yearly blood work may also required for many medications to insure safe prescribing.  If you

## 2021-10-12 DIAGNOSIS — E78.00 HYPERCHOLESTEREMIA: Chronic | ICD-10-CM

## 2021-10-12 DIAGNOSIS — I10 ESSENTIAL HYPERTENSION: Chronic | ICD-10-CM

## 2021-10-12 RX ORDER — ATORVASTATIN CALCIUM 20 MG/1
TABLET, FILM COATED ORAL
Qty: 90 TABLET | Refills: 1 | Status: SHIPPED | OUTPATIENT
Start: 2021-10-12

## 2021-10-12 NOTE — TELEPHONE ENCOUNTER
Requested Prescriptions     Pending Prescriptions Disp Refills   • ATORVASTATIN 20 MG Oral Tab [Pharmacy Med Name: ATORVASTATIN 20MG TABLETS] 90 tablet 1     Sig: TAKE 1 TABLET(20 MG) BY MOUTH EVERY NIGHT       LOV:10/6/21  FOV:2/9/22  Refill:3/23/21    A1

## 2021-10-13 RX ORDER — LOSARTAN POTASSIUM 100 MG/1
TABLET ORAL
Qty: 90 TABLET | Refills: 1 | Status: SHIPPED | OUTPATIENT
Start: 2021-10-13 | End: 2022-01-12

## 2021-10-13 RX ORDER — ASPIRIN 81 MG/1
TABLET, COATED ORAL
Qty: 90 TABLET | Refills: 1 | Status: SHIPPED | OUTPATIENT
Start: 2021-10-13

## 2021-10-13 NOTE — TELEPHONE ENCOUNTER
Medication(s) to Refill:   Requested Prescriptions     Pending Prescriptions Disp Refills   • ASPIRIN LOW DOSE 81 MG Oral Tab EC [Pharmacy Med Name: ASPIRIN 81MG EC LOW DOSE TABLETS] 90 tablet 1     Sig: TAKE 1 TABLET BY MOUTH DAILY   • LOSARTAN 100 MG [place]

## 2021-11-12 ENCOUNTER — TELEPHONE (OUTPATIENT)
Dept: INTERNAL MEDICINE CLINIC | Facility: CLINIC | Age: 51
End: 2021-11-12

## 2021-11-12 NOTE — TELEPHONE ENCOUNTER
Pt dropped off form from [facility] Physical Examination Form) to be completed, pt will  form once is ready, form placed at dr. Kelsey Menjivar folder for fup, pt requested to call him as soonest form is ready at [phone]

## 2021-11-15 NOTE — TELEPHONE ENCOUNTER
Call placed to patient to inform form ready for     Requested form to be faxed .  Form faxed with confirmation    orignal form placed at front    Copy sent to scanning and copy placed in accordion file

## 2021-12-02 ENCOUNTER — IMMUNIZATION (OUTPATIENT)
Dept: LAB | Facility: HOSPITAL | Age: 51
End: 2021-12-02
Attending: EMERGENCY MEDICINE
Payer: COMMERCIAL

## 2021-12-02 DIAGNOSIS — Z23 NEED FOR VACCINATION: Primary | ICD-10-CM

## 2021-12-02 PROCEDURE — 0064A SARSCOV2 VAC 50MCG/0.25ML IM: CPT

## 2022-01-05 ENCOUNTER — TELEPHONE (OUTPATIENT)
Dept: INTERNAL MEDICINE CLINIC | Facility: CLINIC | Age: 52
End: 2022-01-05

## 2022-01-12 ENCOUNTER — TELEPHONE (OUTPATIENT)
Dept: INTERNAL MEDICINE CLINIC | Facility: CLINIC | Age: 52
End: 2022-01-12

## 2022-01-12 RX ORDER — VALSARTAN 160 MG/1
160 TABLET ORAL DAILY
Qty: 90 TABLET | Refills: 3 | Status: SHIPPED | OUTPATIENT
Start: 2022-01-12 | End: 2023-01-07

## 2022-01-25 ENCOUNTER — TELEPHONE (OUTPATIENT)
Dept: ENDOCRINOLOGY CLINIC | Facility: CLINIC | Age: 52
End: 2022-01-25

## 2022-01-25 DIAGNOSIS — E11.65 TYPE 2 DIABETES MELLITUS WITH HYPERGLYCEMIA, WITHOUT LONG-TERM CURRENT USE OF INSULIN (HCC): Primary | ICD-10-CM

## 2022-02-22 ENCOUNTER — LAB ENCOUNTER (OUTPATIENT)
Dept: LAB | Age: 52
End: 2022-02-22
Attending: NURSE PRACTITIONER
Payer: COMMERCIAL

## 2022-02-22 DIAGNOSIS — E11.65 TYPE 2 DIABETES MELLITUS WITH HYPERGLYCEMIA, WITHOUT LONG-TERM CURRENT USE OF INSULIN (HCC): ICD-10-CM

## 2022-02-22 LAB
EST. AVERAGE GLUCOSE BLD GHB EST-MCNC: 171 MG/DL (ref 68–126)
HBA1C MFR BLD: 7.6 % (ref ?–5.7)

## 2022-02-22 PROCEDURE — 83036 HEMOGLOBIN GLYCOSYLATED A1C: CPT | Performed by: NURSE PRACTITIONER

## 2022-02-22 PROCEDURE — 3051F HG A1C>EQUAL 7.0%<8.0%: CPT | Performed by: INTERNAL MEDICINE

## 2022-02-24 ENCOUNTER — TELEMEDICINE (OUTPATIENT)
Dept: ENDOCRINOLOGY CLINIC | Facility: CLINIC | Age: 52
End: 2022-02-24

## 2022-02-24 DIAGNOSIS — I10 ESSENTIAL HYPERTENSION: ICD-10-CM

## 2022-02-24 DIAGNOSIS — E11.65 TYPE 2 DIABETES MELLITUS WITH HYPERGLYCEMIA, WITHOUT LONG-TERM CURRENT USE OF INSULIN (HCC): Primary | ICD-10-CM

## 2022-02-24 DIAGNOSIS — E78.5 DYSLIPIDEMIA: ICD-10-CM

## 2022-02-24 PROCEDURE — 95251 CONT GLUC MNTR ANALYSIS I&R: CPT | Performed by: NURSE PRACTITIONER

## 2022-02-24 PROCEDURE — 99214 OFFICE O/P EST MOD 30 MIN: CPT | Performed by: NURSE PRACTITIONER

## 2022-02-24 RX ORDER — FLASH GLUCOSE SENSOR
1 KIT MISCELLANEOUS
Qty: 1 EACH | Refills: 11 | OUTPATIENT
Start: 2022-02-24

## 2022-03-21 RX ORDER — EMPAGLIFLOZIN 10 MG/1
TABLET, FILM COATED ORAL
Qty: 30 TABLET | Refills: 3 | Status: SHIPPED | OUTPATIENT
Start: 2022-03-21

## 2022-03-31 ENCOUNTER — TELEPHONE (OUTPATIENT)
Dept: INTERNAL MEDICINE CLINIC | Facility: CLINIC | Age: 52
End: 2022-03-31

## 2022-03-31 NOTE — TELEPHONE ENCOUNTER
Pt made a f/u appointment for Monday. Requests that Dr Shellia Rubinstein order lab work for him to complete for tomorrow morning before he starts his month of fasting.      Please advise if labs can be order today for future appointment     Future Appointments   Date Time Provider Ion Salas   4/4/2022 10:45 AM Shania Reeves MD [facility] [place]   6/15/2022  1:45 PM Yulissa Barrett APRN [id number] [place]

## 2022-04-01 ENCOUNTER — LAB ENCOUNTER (OUTPATIENT)
Dept: LAB | Age: 52
End: 2022-04-01
Attending: INTERNAL MEDICINE
Payer: MEDICAID

## 2022-04-01 DIAGNOSIS — E11.21 DIABETIC NEPHROPATHY ASSOCIATED WITH TYPE 2 DIABETES MELLITUS (HCC): ICD-10-CM

## 2022-04-01 DIAGNOSIS — E78.00 HYPERCHOLESTEREMIA: ICD-10-CM

## 2022-04-01 DIAGNOSIS — I10 ESSENTIAL HYPERTENSION: ICD-10-CM

## 2022-04-01 LAB
ALBUMIN SERPL-MCNC: 4 G/DL (ref 3.4–5)
ALBUMIN/GLOB SERPL: 1.3 {RATIO} (ref 1–2)
ALP LIVER SERPL-CCNC: 115 U/L
ALT SERPL-CCNC: 41 U/L
ANION GAP SERPL CALC-SCNC: 4 MMOL/L (ref 0–18)
AST SERPL-CCNC: 23 U/L (ref 15–37)
BILIRUB SERPL-MCNC: 0.7 MG/DL (ref 0.1–2)
BUN BLD-MCNC: 12 MG/DL (ref 7–18)
CALCIUM BLD-MCNC: 9.2 MG/DL (ref 8.5–10.1)
CHLORIDE SERPL-SCNC: 107 MMOL/L (ref 98–112)
CHOLEST SERPL-MCNC: 152 MG/DL (ref ?–200)
CO2 SERPL-SCNC: 27 MMOL/L (ref 21–32)
CREAT BLD-MCNC: 0.93 MG/DL
EST. AVERAGE GLUCOSE BLD GHB EST-MCNC: 194 MG/DL (ref 68–126)
FASTING PATIENT LIPID ANSWER: YES
FASTING STATUS PATIENT QL REPORTED: YES
GLOBULIN PLAS-MCNC: 3.2 G/DL (ref 2.8–4.4)
GLUCOSE BLD-MCNC: 170 MG/DL (ref 70–99)
HBA1C MFR BLD: 8.4 % (ref ?–5.7)
HDLC SERPL-MCNC: 50 MG/DL (ref 40–59)
LDLC SERPL CALC-MCNC: 88 MG/DL (ref ?–100)
NONHDLC SERPL-MCNC: 102 MG/DL (ref ?–130)
OSMOLALITY SERPL CALC.SUM OF ELEC: 290 MOSM/KG (ref 275–295)
POTASSIUM SERPL-SCNC: 5 MMOL/L (ref 3.5–5.1)
PROT SERPL-MCNC: 7.2 G/DL (ref 6.4–8.2)
SODIUM SERPL-SCNC: 138 MMOL/L (ref 136–145)
TRIGL SERPL-MCNC: 69 MG/DL (ref 30–149)
VLDLC SERPL CALC-MCNC: 11 MG/DL (ref 0–30)

## 2022-04-01 PROCEDURE — 80053 COMPREHEN METABOLIC PANEL: CPT

## 2022-04-01 PROCEDURE — 36415 COLL VENOUS BLD VENIPUNCTURE: CPT

## 2022-04-01 PROCEDURE — 83036 HEMOGLOBIN GLYCOSYLATED A1C: CPT

## 2022-04-01 PROCEDURE — 80061 LIPID PANEL: CPT

## 2022-04-01 PROCEDURE — 3052F HG A1C>EQUAL 8.0%<EQUAL 9.0%: CPT | Performed by: INTERNAL MEDICINE

## 2022-04-04 ENCOUNTER — HOSPITAL ENCOUNTER (OUTPATIENT)
Dept: GENERAL RADIOLOGY | Age: 52
Discharge: HOME | End: 2022-04-04
Attending: INTERNAL MEDICINE
Payer: MEDICAID

## 2022-04-04 ENCOUNTER — OFFICE VISIT (OUTPATIENT)
Dept: INTERNAL MEDICINE CLINIC | Facility: CLINIC | Age: 52
End: 2022-04-04
Payer: MEDICAID

## 2022-04-04 VITALS
HEIGHT: 69 IN | BODY MASS INDEX: 33.62 KG/M2 | WEIGHT: 227 LBS | OXYGEN SATURATION: 98 % | DIASTOLIC BLOOD PRESSURE: 90 MMHG | HEART RATE: 89 BPM | RESPIRATION RATE: 18 BRPM | SYSTOLIC BLOOD PRESSURE: 148 MMHG | TEMPERATURE: 98 F

## 2022-04-04 DIAGNOSIS — G89.29 CHRONIC PAIN OF RIGHT KNEE: ICD-10-CM

## 2022-04-04 DIAGNOSIS — M54.2 NECK PAIN: Primary | ICD-10-CM

## 2022-04-04 DIAGNOSIS — M54.2 NECK PAIN: ICD-10-CM

## 2022-04-04 DIAGNOSIS — M25.561 CHRONIC PAIN OF RIGHT KNEE: ICD-10-CM

## 2022-04-04 PROCEDURE — 3080F DIAST BP >= 90 MM HG: CPT | Performed by: INTERNAL MEDICINE

## 2022-04-04 PROCEDURE — 72050 X-RAY EXAM NECK SPINE 4/5VWS: CPT | Performed by: INTERNAL MEDICINE

## 2022-04-04 PROCEDURE — 3008F BODY MASS INDEX DOCD: CPT | Performed by: INTERNAL MEDICINE

## 2022-04-04 PROCEDURE — 3077F SYST BP >= 140 MM HG: CPT | Performed by: INTERNAL MEDICINE

## 2022-04-04 PROCEDURE — 99213 OFFICE O/P EST LOW 20 MIN: CPT | Performed by: INTERNAL MEDICINE

## 2022-04-04 RX ORDER — MELOXICAM 7.5 MG/1
7.5 TABLET ORAL DAILY
Qty: 30 TABLET | Refills: 1 | Status: SHIPPED | OUTPATIENT
Start: 2022-04-04

## 2022-04-04 RX ORDER — CYCLOBENZAPRINE HCL 5 MG
5 TABLET ORAL NIGHTLY
Qty: 15 TABLET | Refills: 0 | Status: SHIPPED | OUTPATIENT
Start: 2022-04-04 | End: 2022-04-19

## 2022-04-04 RX ORDER — ASPIRIN 81 MG/1
81 TABLET ORAL DAILY
Qty: 90 TABLET | Refills: 1 | Status: SHIPPED | OUTPATIENT
Start: 2022-04-04

## 2022-04-04 NOTE — PATIENT INSTRUCTIONS
pending physical therapy. Patient apparently checked in late for his appointment subsequently starts complaining of some urine discomfort. Patient advised he needs to make another appointment for follow-up of this symptom as is not enough time allocated to address his multiple issues his chief complaint at the time of visit was his neck pain. The patient was advised to keep follow-up appointments he had been earlier given referral for his trigger finger as well as his knee pain neither of which he followed up with.

## 2022-05-23 DIAGNOSIS — E11.65 TYPE 2 DIABETES MELLITUS WITH HYPERGLYCEMIA, WITHOUT LONG-TERM CURRENT USE OF INSULIN (HCC): ICD-10-CM

## 2022-07-21 ENCOUNTER — TELEPHONE (OUTPATIENT)
Dept: ORTHOPEDICS CLINIC | Facility: CLINIC | Age: 52
End: 2022-07-21

## 2022-07-21 DIAGNOSIS — M25.561 RIGHT KNEE PAIN, UNSPECIFIED CHRONICITY: Primary | ICD-10-CM

## 2022-07-21 NOTE — TELEPHONE ENCOUNTER
Future Appointments   Date Time Provider Ion Salas   8/31/2022  2:20 PM Bela Quinones MD [facility]     This patient is coming for RT Knee Pain. No prior imaging done yet. Please advise if views are needed for this appt. Thanks.     Patient can be reached at [phone]

## 2022-08-31 ENCOUNTER — OFFICE VISIT (OUTPATIENT)
Dept: ORTHOPEDICS CLINIC | Facility: CLINIC | Age: 52
End: 2022-08-31
Payer: MEDICAID

## 2022-08-31 ENCOUNTER — HOSPITAL ENCOUNTER (OUTPATIENT)
Dept: GENERAL RADIOLOGY | Age: 52
Discharge: HOME | End: 2022-08-31
Attending: ORTHOPAEDIC SURGERY
Payer: MEDICAID

## 2022-08-31 VITALS — HEART RATE: 103 BPM | HEIGHT: 69 IN | OXYGEN SATURATION: 97 % | BODY MASS INDEX: 33.47 KG/M2 | WEIGHT: 226 LBS

## 2022-08-31 DIAGNOSIS — M25.561 RIGHT KNEE PAIN, UNSPECIFIED CHRONICITY: ICD-10-CM

## 2022-08-31 DIAGNOSIS — G89.29 CHRONIC PAIN OF RIGHT KNEE: Primary | ICD-10-CM

## 2022-08-31 DIAGNOSIS — M25.561 CHRONIC PAIN OF RIGHT KNEE: Primary | ICD-10-CM

## 2022-08-31 PROCEDURE — 99203 OFFICE O/P NEW LOW 30 MIN: CPT | Performed by: ORTHOPAEDIC SURGERY

## 2022-08-31 PROCEDURE — 3008F BODY MASS INDEX DOCD: CPT | Performed by: ORTHOPAEDIC SURGERY

## 2022-08-31 PROCEDURE — 73564 X-RAY EXAM KNEE 4 OR MORE: CPT | Performed by: ORTHOPAEDIC SURGERY

## 2022-09-01 ENCOUNTER — TELEPHONE (OUTPATIENT)
Dept: INTERNAL MEDICINE CLINIC | Facility: CLINIC | Age: 52
End: 2022-09-01

## 2022-09-01 ENCOUNTER — TELEMEDICINE (OUTPATIENT)
Dept: INTERNAL MEDICINE CLINIC | Facility: CLINIC | Age: 52
End: 2022-09-01
Payer: MEDICAID

## 2022-09-01 DIAGNOSIS — L30.9 DERMATITIS: Primary | ICD-10-CM

## 2022-09-01 PROCEDURE — 99213 OFFICE O/P EST LOW 20 MIN: CPT | Performed by: INTERNAL MEDICINE

## 2022-09-01 RX ORDER — CLOTRIMAZOLE AND BETAMETHASONE DIPROPIONATE 10; .64 MG/G; MG/G
1 CREAM TOPICAL 2 TIMES DAILY
Qty: 15 G | Refills: 3 | Status: SHIPPED | OUTPATIENT
Start: 2022-09-01 | End: 2022-10-01

## 2022-09-01 NOTE — TELEPHONE ENCOUNTER
Well that is what he needs and that is what he needs to get. Getting half of the medicine does not serve the purpose. Otherwise he should find out what is an equivalent option.   Most likely the insurance company do not want a pay- he may have to buy  Lotrimin 1% and hydrocortisone 1% over-the-counter mix it and use it

## 2022-09-01 NOTE — PROGRESS NOTES
Virtual Telephone Check-In    Marcell Alejandro verbally consents to a Virtual/Telephone Check-In visit on 09/01/22. Patient has been referred to the [facility] website at [email or web address] to review the yearly Consent to Treat document. Patient understands and accepts financial responsibility for any deductible, co-insurance and/or co-pays associated with this service. Duration of the service: 13 minutes      Summary of topics discussed: Patient had rash on the dorsum of the right hand first webspace for last few months has been applying topical Neosporin with no relief. Valuation shows the patient to have a weeping rash possibly contact dermatitis from use of Neosporin. Patient advised to stop using Neosporin to let the wound dry up. Should apply topical Caladryl over-the-counter twice a day. Once the wound dries up the patient may use the prescription medicine which is subsequently being sent. Patient to see me back in 1 week. Diagnoses and all orders for this visit:    Dermatitis  -     clotrimazole-betamethasone 1-0.05 % External Cream; Apply 1 Application topically 2 (two) times daily. Apply Caladryl over-the-counter locally avoid itching.   Silvestre Blanco MD

## 2022-09-01 NOTE — TELEPHONE ENCOUNTER
Romel stated pt insurance will not cover clotrimazole-betamethasone 1-0.05 % External Cream. Per romel pt insurance will cover bethamethsone.      Confirmed [phone]

## 2022-09-02 ENCOUNTER — TELEPHONE (OUTPATIENT)
Dept: PHYSICAL THERAPY | Facility: HOSPITAL | Age: 52
End: 2022-09-02

## 2022-09-02 RX ORDER — TRIAMCINOLONE ACETONIDE 1 MG/G
CREAM TOPICAL 2 TIMES DAILY PRN
Qty: 15 G | Refills: 3 | Status: SHIPPED | OUTPATIENT
Start: 2022-09-02

## 2022-09-02 RX ORDER — CLOTRIMAZOLE 1 %
1 CREAM (GRAM) TOPICAL 2 TIMES DAILY
Qty: 14 G | Refills: 3 | Status: SHIPPED | OUTPATIENT
Start: 2022-09-02

## 2022-09-09 ENCOUNTER — TELEPHONE (OUTPATIENT)
Dept: INTERNAL MEDICINE CLINIC | Facility: CLINIC | Age: 52
End: 2022-09-09

## 2022-09-09 NOTE — TELEPHONE ENCOUNTER
Patient called wondering how to apply creams VM prescribed (Clotrimazole and Triamcinolone). Patient was advised to apply the clotrimazole morning and evening. The Triamcinolone cream is as needed, can be used in between applying the clotrimazole. Patient verbalized understanding.

## 2022-09-12 ENCOUNTER — APPOINTMENT (OUTPATIENT)
Dept: PHYSICAL THERAPY | Facility: HOSPITAL | Age: 52
End: 2022-09-12
Attending: ORTHOPAEDIC SURGERY
Payer: MEDICAID

## 2022-09-14 ENCOUNTER — APPOINTMENT (OUTPATIENT)
Dept: PHYSICAL THERAPY | Facility: HOSPITAL | Age: 52
End: 2022-09-14
Attending: ORTHOPAEDIC SURGERY
Payer: MEDICAID

## 2022-09-21 ENCOUNTER — OFFICE VISIT (OUTPATIENT)
Dept: PHYSICAL THERAPY | Age: 52
End: 2022-09-21
Attending: ORTHOPAEDIC SURGERY

## 2022-09-21 DIAGNOSIS — G89.29 CHRONIC PAIN OF RIGHT KNEE: ICD-10-CM

## 2022-09-21 DIAGNOSIS — M25.561 CHRONIC PAIN OF RIGHT KNEE: ICD-10-CM

## 2022-09-21 PROCEDURE — 97110 THERAPEUTIC EXERCISES: CPT | Performed by: PHYSICAL THERAPIST

## 2022-09-21 PROCEDURE — 97161 PT EVAL LOW COMPLEX 20 MIN: CPT | Performed by: PHYSICAL THERAPIST

## 2022-09-21 PROCEDURE — 97140 MANUAL THERAPY 1/> REGIONS: CPT | Performed by: PHYSICAL THERAPIST

## 2022-09-26 ENCOUNTER — APPOINTMENT (OUTPATIENT)
Dept: PHYSICAL THERAPY | Facility: HOSPITAL | Age: 52
End: 2022-09-26
Attending: ORTHOPAEDIC SURGERY
Payer: MEDICAID

## 2022-09-28 ENCOUNTER — OFFICE VISIT (OUTPATIENT)
Dept: PHYSICAL THERAPY | Age: 52
End: 2022-09-28
Attending: ORTHOPAEDIC SURGERY

## 2022-09-28 ENCOUNTER — APPOINTMENT (OUTPATIENT)
Dept: PHYSICAL THERAPY | Facility: HOSPITAL | Age: 52
End: 2022-09-28
Attending: ORTHOPAEDIC SURGERY
Payer: MEDICAID

## 2022-09-28 PROCEDURE — 97140 MANUAL THERAPY 1/> REGIONS: CPT | Performed by: PHYSICAL THERAPIST

## 2022-09-28 PROCEDURE — 97110 THERAPEUTIC EXERCISES: CPT | Performed by: PHYSICAL THERAPIST

## 2022-10-03 ENCOUNTER — APPOINTMENT (OUTPATIENT)
Dept: PHYSICAL THERAPY | Facility: HOSPITAL | Age: 52
End: 2022-10-03
Attending: ORTHOPAEDIC SURGERY
Payer: MEDICAID

## 2022-10-03 ENCOUNTER — OFFICE VISIT (OUTPATIENT)
Dept: PHYSICAL THERAPY | Age: 52
End: 2022-10-03
Attending: ORTHOPAEDIC SURGERY
Payer: MEDICAID

## 2022-10-03 PROCEDURE — 97140 MANUAL THERAPY 1/> REGIONS: CPT | Performed by: PHYSICAL THERAPIST

## 2022-10-03 PROCEDURE — 97110 THERAPEUTIC EXERCISES: CPT | Performed by: PHYSICAL THERAPIST

## 2022-10-05 ENCOUNTER — OFFICE VISIT (OUTPATIENT)
Dept: ENDOCRINOLOGY CLINIC | Facility: CLINIC | Age: 52
End: 2022-10-05
Payer: MEDICAID

## 2022-10-05 ENCOUNTER — OFFICE VISIT (OUTPATIENT)
Dept: PHYSICAL THERAPY | Age: 52
End: 2022-10-05
Attending: ORTHOPAEDIC SURGERY
Payer: MEDICAID

## 2022-10-05 VITALS
BODY MASS INDEX: 34 KG/M2 | SYSTOLIC BLOOD PRESSURE: 138 MMHG | DIASTOLIC BLOOD PRESSURE: 82 MMHG | WEIGHT: 232.81 LBS | HEART RATE: 84 BPM

## 2022-10-05 DIAGNOSIS — E11.65 TYPE 2 DIABETES MELLITUS WITH HYPERGLYCEMIA, WITHOUT LONG-TERM CURRENT USE OF INSULIN (HCC): Primary | ICD-10-CM

## 2022-10-05 DIAGNOSIS — E78.00 HYPERCHOLESTEREMIA: ICD-10-CM

## 2022-10-05 DIAGNOSIS — E11.21 DIABETIC NEPHROPATHY ASSOCIATED WITH TYPE 2 DIABETES MELLITUS (HCC): ICD-10-CM

## 2022-10-05 DIAGNOSIS — I10 ESSENTIAL HYPERTENSION: ICD-10-CM

## 2022-10-05 LAB
CARTRIDGE LOT#: 978 NUMERIC
CREAT UR-SCNC: 30.8 MG/DL
HEMOGLOBIN A1C: 8.1 % (ref 4.3–5.6)
MICROALBUMIN UR-MCNC: 10.8 MG/DL
MICROALBUMIN/CREAT 24H UR-RTO: 350.6 UG/MG (ref ?–30)

## 2022-10-05 PROCEDURE — 82570 ASSAY OF URINE CREATININE: CPT | Performed by: NURSE PRACTITIONER

## 2022-10-05 PROCEDURE — 3075F SYST BP GE 130 - 139MM HG: CPT | Performed by: NURSE PRACTITIONER

## 2022-10-05 PROCEDURE — 83036 HEMOGLOBIN GLYCOSYLATED A1C: CPT | Performed by: NURSE PRACTITIONER

## 2022-10-05 PROCEDURE — 97112 NEUROMUSCULAR REEDUCATION: CPT | Performed by: PHYSICAL THERAPIST

## 2022-10-05 PROCEDURE — 3079F DIAST BP 80-89 MM HG: CPT | Performed by: NURSE PRACTITIONER

## 2022-10-05 PROCEDURE — 99214 OFFICE O/P EST MOD 30 MIN: CPT | Performed by: NURSE PRACTITIONER

## 2022-10-05 PROCEDURE — 97110 THERAPEUTIC EXERCISES: CPT | Performed by: PHYSICAL THERAPIST

## 2022-10-05 PROCEDURE — 82043 UR ALBUMIN QUANTITATIVE: CPT | Performed by: NURSE PRACTITIONER

## 2022-10-10 ENCOUNTER — OFFICE VISIT (OUTPATIENT)
Dept: PHYSICAL THERAPY | Age: 52
End: 2022-10-10
Attending: ORTHOPAEDIC SURGERY
Payer: MEDICAID

## 2022-10-10 ENCOUNTER — APPOINTMENT (OUTPATIENT)
Dept: PHYSICAL THERAPY | Facility: HOSPITAL | Age: 52
End: 2022-10-10
Attending: ORTHOPAEDIC SURGERY
Payer: MEDICAID

## 2022-10-10 PROCEDURE — 97110 THERAPEUTIC EXERCISES: CPT | Performed by: PHYSICAL THERAPIST

## 2022-10-10 PROCEDURE — 97112 NEUROMUSCULAR REEDUCATION: CPT | Performed by: PHYSICAL THERAPIST

## 2022-10-12 ENCOUNTER — OFFICE VISIT (OUTPATIENT)
Dept: PHYSICAL THERAPY | Age: 52
End: 2022-10-12
Attending: ORTHOPAEDIC SURGERY
Payer: MEDICAID

## 2022-10-12 PROCEDURE — 97112 NEUROMUSCULAR REEDUCATION: CPT | Performed by: PHYSICAL THERAPIST

## 2022-10-12 PROCEDURE — 97110 THERAPEUTIC EXERCISES: CPT | Performed by: PHYSICAL THERAPIST

## 2022-10-18 RX ORDER — GLIPIZIDE 5 MG/1
TABLET ORAL
Qty: 180 TABLET | Refills: 1 | Status: SHIPPED | OUTPATIENT
Start: 2022-10-18 | End: 2023-04-11

## 2022-10-31 ENCOUNTER — APPOINTMENT (OUTPATIENT)
Dept: PHYSICAL THERAPY | Age: 52
End: 2022-10-31
Attending: ORTHOPAEDIC SURGERY
Payer: MEDICAID

## 2022-10-31 ENCOUNTER — TELEPHONE (OUTPATIENT)
Dept: PHYSICAL THERAPY | Facility: HOSPITAL | Age: 52
End: 2022-10-31

## 2022-11-02 RX ORDER — EMPAGLIFLOZIN 10 MG/1
TABLET, FILM COATED ORAL
Qty: 30 TABLET | Refills: 2 | Status: SHIPPED | OUTPATIENT
Start: 2022-11-02

## 2022-11-03 RX ORDER — ASPIRIN 81 MG/1
TABLET, COATED ORAL
Qty: 90 TABLET | Refills: 1 | Status: SHIPPED | OUTPATIENT
Start: 2022-11-03

## 2022-11-04 ENCOUNTER — OFFICE VISIT (OUTPATIENT)
Dept: PHYSICAL THERAPY | Age: 52
End: 2022-11-04
Attending: ORTHOPAEDIC SURGERY
Payer: MEDICAID

## 2022-11-04 PROCEDURE — 97110 THERAPEUTIC EXERCISES: CPT | Performed by: PHYSICAL THERAPIST

## 2022-11-04 PROCEDURE — 97112 NEUROMUSCULAR REEDUCATION: CPT | Performed by: PHYSICAL THERAPIST

## 2022-11-07 ENCOUNTER — APPOINTMENT (OUTPATIENT)
Dept: PHYSICAL THERAPY | Age: 52
End: 2022-11-07
Attending: ORTHOPAEDIC SURGERY
Payer: MEDICAID

## 2022-11-07 ENCOUNTER — OFFICE VISIT (OUTPATIENT)
Dept: PHYSICAL THERAPY | Age: 52
End: 2022-11-07
Attending: ORTHOPAEDIC SURGERY
Payer: MEDICAID

## 2022-11-07 PROCEDURE — 97112 NEUROMUSCULAR REEDUCATION: CPT | Performed by: PHYSICAL THERAPIST

## 2022-11-07 PROCEDURE — 97110 THERAPEUTIC EXERCISES: CPT | Performed by: PHYSICAL THERAPIST

## 2022-11-16 ENCOUNTER — OFFICE VISIT (OUTPATIENT)
Dept: PHYSICAL THERAPY | Age: 52
End: 2022-11-16
Attending: SURGERY
Payer: MEDICAID

## 2022-11-16 PROCEDURE — 97112 NEUROMUSCULAR REEDUCATION: CPT | Performed by: PHYSICAL THERAPIST

## 2022-11-16 PROCEDURE — 97110 THERAPEUTIC EXERCISES: CPT | Performed by: PHYSICAL THERAPIST

## 2022-11-23 ENCOUNTER — APPOINTMENT (OUTPATIENT)
Dept: PHYSICAL THERAPY | Age: 52
End: 2022-11-23
Attending: SURGERY
Payer: MEDICAID

## 2022-11-30 DIAGNOSIS — E78.00 HYPERCHOLESTEREMIA: Chronic | ICD-10-CM

## 2022-12-01 RX ORDER — ATORVASTATIN CALCIUM 20 MG/1
TABLET, FILM COATED ORAL
Qty: 90 TABLET | Refills: 1 | Status: SHIPPED | OUTPATIENT
Start: 2022-12-01

## 2022-12-07 ENCOUNTER — OFFICE VISIT (OUTPATIENT)
Dept: ENDOCRINOLOGY CLINIC | Facility: CLINIC | Age: 52
End: 2022-12-07
Payer: MEDICAID

## 2022-12-07 VITALS
OXYGEN SATURATION: 96 % | HEART RATE: 93 BPM | RESPIRATION RATE: 17 BRPM | WEIGHT: 229.63 LBS | SYSTOLIC BLOOD PRESSURE: 136 MMHG | BODY MASS INDEX: 34 KG/M2 | DIASTOLIC BLOOD PRESSURE: 72 MMHG

## 2022-12-07 DIAGNOSIS — E78.00 HYPERCHOLESTEREMIA: ICD-10-CM

## 2022-12-07 DIAGNOSIS — E11.65 TYPE 2 DIABETES MELLITUS WITH HYPERGLYCEMIA, WITHOUT LONG-TERM CURRENT USE OF INSULIN (HCC): Primary | ICD-10-CM

## 2022-12-07 DIAGNOSIS — I10 ESSENTIAL HYPERTENSION: ICD-10-CM

## 2022-12-07 LAB
CARTRIDGE LOT#: 535 NUMERIC
HEMOGLOBIN A1C: 7.7 % (ref 4.3–5.6)

## 2022-12-07 PROCEDURE — 83036 HEMOGLOBIN GLYCOSYLATED A1C: CPT | Performed by: NURSE PRACTITIONER

## 2022-12-07 PROCEDURE — 3075F SYST BP GE 130 - 139MM HG: CPT | Performed by: NURSE PRACTITIONER

## 2022-12-07 PROCEDURE — 95251 CONT GLUC MNTR ANALYSIS I&R: CPT | Performed by: NURSE PRACTITIONER

## 2022-12-07 PROCEDURE — 3078F DIAST BP <80 MM HG: CPT | Performed by: NURSE PRACTITIONER

## 2022-12-07 PROCEDURE — 99214 OFFICE O/P EST MOD 30 MIN: CPT | Performed by: NURSE PRACTITIONER

## 2022-12-27 RX ORDER — FLASH GLUCOSE SENSOR
KIT MISCELLANEOUS
Qty: 6 EACH | Refills: 0 | Status: SHIPPED | OUTPATIENT
Start: 2022-12-27

## 2022-12-28 ENCOUNTER — TELEPHONE (OUTPATIENT)
Dept: ENDOCRINOLOGY CLINIC | Facility: CLINIC | Age: 52
End: 2022-12-28

## 2022-12-28 NOTE — TELEPHONE ENCOUNTER
Received PA request for LAXMI DE LA FUENTE [facility] - pt not on insulin, paying out of pocket.

## 2022-12-30 ENCOUNTER — TELEPHONE (OUTPATIENT)
Dept: ENDOCRINOLOGY CLINIC | Facility: CLINIC | Age: 52
End: 2022-12-30

## 2022-12-30 NOTE — TELEPHONE ENCOUNTER
Received request for vicki 14 day sensors. Sent on 12/27/2022, faxed back to pharmacy with above information.

## 2023-01-11 ENCOUNTER — OFFICE VISIT (OUTPATIENT)
Dept: INTERNAL MEDICINE CLINIC | Facility: CLINIC | Age: 53
End: 2023-01-11
Payer: MEDICAID

## 2023-01-11 VITALS
SYSTOLIC BLOOD PRESSURE: 132 MMHG | WEIGHT: 228.19 LBS | OXYGEN SATURATION: 99 % | HEART RATE: 86 BPM | BODY MASS INDEX: 34 KG/M2 | DIASTOLIC BLOOD PRESSURE: 84 MMHG | TEMPERATURE: 97 F

## 2023-01-11 DIAGNOSIS — M54.50 ACUTE BILATERAL LOW BACK PAIN WITHOUT SCIATICA: Primary | ICD-10-CM

## 2023-01-11 DIAGNOSIS — Z00.00 BLOOD TESTS FOR ROUTINE GENERAL PHYSICAL EXAMINATION: ICD-10-CM

## 2023-01-11 DIAGNOSIS — Z12.11 SCREENING FOR COLON CANCER: ICD-10-CM

## 2023-01-11 DIAGNOSIS — E55.9 VITAMIN D DEFICIENCY: ICD-10-CM

## 2023-01-11 DIAGNOSIS — E11.21 DIABETIC NEPHROPATHY ASSOCIATED WITH TYPE 2 DIABETES MELLITUS (HCC): Chronic | ICD-10-CM

## 2023-01-11 DIAGNOSIS — E78.00 HYPERCHOLESTEREMIA: Chronic | ICD-10-CM

## 2023-01-11 DIAGNOSIS — I10 ESSENTIAL HYPERTENSION: Chronic | ICD-10-CM

## 2023-01-11 DIAGNOSIS — E53.8 VITAMIN B12 DEFICIENCY: ICD-10-CM

## 2023-01-11 PROCEDURE — 90471 IMMUNIZATION ADMIN: CPT | Performed by: INTERNAL MEDICINE

## 2023-01-11 PROCEDURE — 3075F SYST BP GE 130 - 139MM HG: CPT | Performed by: INTERNAL MEDICINE

## 2023-01-11 PROCEDURE — 99214 OFFICE O/P EST MOD 30 MIN: CPT | Performed by: INTERNAL MEDICINE

## 2023-01-11 PROCEDURE — 90686 IIV4 VACC NO PRSV 0.5 ML IM: CPT | Performed by: INTERNAL MEDICINE

## 2023-01-11 PROCEDURE — 3079F DIAST BP 80-89 MM HG: CPT | Performed by: INTERNAL MEDICINE

## 2023-01-11 RX ORDER — DORZOLAMIDE HCL 20 MG/ML
SOLUTION/ DROPS OPHTHALMIC
COMMUNITY
Start: 2022-12-26

## 2023-01-11 NOTE — PATIENT INSTRUCTIONS
Please do the FIT test  You received the flu vaccine today    Please get the blood tests and x ray done    Try the exercises I have provided you.  Please follow up with Dr. Tayler Alvarado for a physical

## 2023-01-14 ENCOUNTER — HOSPITAL ENCOUNTER (OUTPATIENT)
Dept: GENERAL RADIOLOGY | Age: 53
Discharge: HOME | End: 2023-01-14
Attending: INTERNAL MEDICINE
Payer: MEDICAID

## 2023-01-14 ENCOUNTER — LAB ENCOUNTER (OUTPATIENT)
Dept: LAB | Age: 53
End: 2023-01-14
Attending: INTERNAL MEDICINE
Payer: MEDICAID

## 2023-01-14 DIAGNOSIS — M54.50 ACUTE BILATERAL LOW BACK PAIN WITHOUT SCIATICA: ICD-10-CM

## 2023-01-14 DIAGNOSIS — E78.00 HYPERCHOLESTEREMIA: Chronic | ICD-10-CM

## 2023-01-14 DIAGNOSIS — Z00.00 BLOOD TESTS FOR ROUTINE GENERAL PHYSICAL EXAMINATION: ICD-10-CM

## 2023-01-14 DIAGNOSIS — E53.8 VITAMIN B12 DEFICIENCY: ICD-10-CM

## 2023-01-14 DIAGNOSIS — E55.9 VITAMIN D DEFICIENCY: ICD-10-CM

## 2023-01-14 DIAGNOSIS — I10 ESSENTIAL HYPERTENSION: Chronic | ICD-10-CM

## 2023-01-14 LAB
ALBUMIN SERPL-MCNC: 3.8 G/DL (ref 3.4–5)
ALBUMIN/GLOB SERPL: 1.1 {RATIO} (ref 1–2)
ALP LIVER SERPL-CCNC: 91 U/L
ALT SERPL-CCNC: 32 U/L
ANION GAP SERPL CALC-SCNC: 4 MMOL/L (ref 0–18)
AST SERPL-CCNC: 19 U/L (ref 15–37)
BASOPHILS # BLD AUTO: 0.05 X10(3) UL (ref 0–0.2)
BASOPHILS NFR BLD AUTO: 0.8 %
BILIRUB SERPL-MCNC: 0.7 MG/DL (ref 0.1–2)
BUN BLD-MCNC: 16 MG/DL (ref 7–18)
CALCIUM BLD-MCNC: 9 MG/DL (ref 8.5–10.1)
CHLORIDE SERPL-SCNC: 107 MMOL/L (ref 98–112)
CHOLEST SERPL-MCNC: 136 MG/DL (ref ?–200)
CO2 SERPL-SCNC: 26 MMOL/L (ref 21–32)
CREAT BLD-MCNC: 0.92 MG/DL
EOSINOPHIL # BLD AUTO: 0.28 X10(3) UL (ref 0–0.7)
EOSINOPHIL NFR BLD AUTO: 4.3 %
ERYTHROCYTE [DISTWIDTH] IN BLOOD BY AUTOMATED COUNT: 13.4 %
FASTING PATIENT LIPID ANSWER: YES
FASTING STATUS PATIENT QL REPORTED: YES
GFR SERPLBLD BASED ON 1.73 SQ M-ARVRAT: 99 ML/MIN/1.73M2 (ref 60–?)
GLOBULIN PLAS-MCNC: 3.5 G/DL (ref 2.8–4.4)
GLUCOSE BLD-MCNC: 143 MG/DL (ref 70–99)
HCT VFR BLD AUTO: 50.2 %
HDLC SERPL-MCNC: 44 MG/DL (ref 40–59)
HGB BLD-MCNC: 16.4 G/DL
IMM GRANULOCYTES # BLD AUTO: 0.02 X10(3) UL (ref 0–1)
IMM GRANULOCYTES NFR BLD: 0.3 %
LDLC SERPL CALC-MCNC: 76 MG/DL (ref ?–100)
LYMPHOCYTES # BLD AUTO: 1.68 X10(3) UL (ref 1–4)
LYMPHOCYTES NFR BLD AUTO: 26 %
MCH RBC QN AUTO: 29.2 PG (ref 26–34)
MCHC RBC AUTO-ENTMCNC: 32.7 G/DL (ref 31–37)
MCV RBC AUTO: 89.5 FL
MONOCYTES # BLD AUTO: 0.5 X10(3) UL (ref 0.1–1)
MONOCYTES NFR BLD AUTO: 7.8 %
NEUTROPHILS # BLD AUTO: 3.92 X10 (3) UL (ref 1.5–7.7)
NEUTROPHILS # BLD AUTO: 3.92 X10(3) UL (ref 1.5–7.7)
NEUTROPHILS NFR BLD AUTO: 60.8 %
NONHDLC SERPL-MCNC: 92 MG/DL (ref ?–130)
OSMOLALITY SERPL CALC.SUM OF ELEC: 288 MOSM/KG (ref 275–295)
PLATELET # BLD AUTO: 171 10(3)UL (ref 150–450)
POTASSIUM SERPL-SCNC: 4.2 MMOL/L (ref 3.5–5.1)
PROT SERPL-MCNC: 7.3 G/DL (ref 6.4–8.2)
RBC # BLD AUTO: 5.61 X10(6)UL
SODIUM SERPL-SCNC: 137 MMOL/L (ref 136–145)
TRIGL SERPL-MCNC: 85 MG/DL (ref 30–149)
VIT B12 SERPL-MCNC: 326 PG/ML (ref 193–986)
VIT D+METAB SERPL-MCNC: 21.2 NG/ML (ref 30–100)
VLDLC SERPL CALC-MCNC: 13 MG/DL (ref 0–30)
WBC # BLD AUTO: 6.5 X10(3) UL (ref 4–11)

## 2023-01-14 PROCEDURE — 82607 VITAMIN B-12: CPT

## 2023-01-14 PROCEDURE — 72110 X-RAY EXAM L-2 SPINE 4/>VWS: CPT | Performed by: INTERNAL MEDICINE

## 2023-01-14 PROCEDURE — 36415 COLL VENOUS BLD VENIPUNCTURE: CPT

## 2023-01-14 PROCEDURE — 82306 VITAMIN D 25 HYDROXY: CPT

## 2023-01-14 PROCEDURE — 80053 COMPREHEN METABOLIC PANEL: CPT

## 2023-01-14 PROCEDURE — 85025 COMPLETE CBC W/AUTO DIFF WBC: CPT

## 2023-01-14 PROCEDURE — 80061 LIPID PANEL: CPT

## 2023-01-23 RX ORDER — VALSARTAN 160 MG/1
TABLET ORAL
Qty: 90 TABLET | Refills: 3 | Status: SHIPPED | OUTPATIENT
Start: 2023-01-23

## 2023-01-29 ENCOUNTER — PATIENT MESSAGE (OUTPATIENT)
Dept: INTERNAL MEDICINE CLINIC | Facility: CLINIC | Age: 53
End: 2023-01-29

## 2023-01-29 DIAGNOSIS — E55.9 VITAMIN D DEFICIENCY: Primary | ICD-10-CM

## 2023-01-30 RX ORDER — ERGOCALCIFEROL 1.25 MG/1
50000 CAPSULE ORAL WEEKLY
Qty: 8 CAPSULE | Refills: 0 | Status: SHIPPED | OUTPATIENT
Start: 2023-01-30 | End: 2023-03-01

## 2023-01-30 NOTE — TELEPHONE ENCOUNTER
From: Tal Luevano  To:  Katia Carr DO  Sent: 1/29/2023 6:38 PM CST  Subject: Question regarding VITAMIN D, 25-HYDROXY    Hi I saw your note about low vitamin D, I do OTC 5000 IU capsule already, but seem like I need more, can u prescribe higher dose for short period to bring the number into normal range

## 2023-02-14 ENCOUNTER — TELEMEDICINE (OUTPATIENT)
Dept: INTERNAL MEDICINE CLINIC | Facility: CLINIC | Age: 53
End: 2023-02-14
Payer: MEDICAID

## 2023-02-14 DIAGNOSIS — J06.9 UPPER RESPIRATORY TRACT INFECTION, UNSPECIFIED TYPE: Primary | ICD-10-CM

## 2023-02-14 PROCEDURE — 99213 OFFICE O/P EST LOW 20 MIN: CPT | Performed by: INTERNAL MEDICINE

## 2023-02-14 NOTE — PROGRESS NOTES
Virtual Telephone Check-In    This visit is conducted using Telemedicine with live, interactive video and audio. Patient resides in [place]   Patient understands and accepts financial responsibility for any deductible, co-insurance and/or co-pays associated with this service. Telehealth outside of [address] Verbal Consent   I conducted a telehealth visit with HUDSON on 2/14/2023'  which was completed using two-way, real-time interactive audio and video  communication. This has been done in good yandy to provide continuity of care in the best interest of the provider-patient relationship, due to the COVID -19 public health crisis/national emergency where restrictions of face-to-face office visits are ongoing. Every conscious effort was taken to allow for sufficient and adequate time to complete the visit. The patient was made aware of the limitations of the telehealth visit, including treatment limitations as no physical exam could be performed. The patient was advised to call 911 or to go to the ER in case there was an emergency. The patient was also advised of the potential privacy & security concerns related to the telehealth platform. The patient was made aware of where to find [facility] notice of privacy practices, telehealth consent form and other related consent forms and documents. which are located on the [facility] website. The patient verbally agreed to telehealth consent form, related consents and the risks discussed. Lastly, the patient confirmed that they were in [place]. Included in this visit, time may have been spent reviewing labs, medications, radiology tests and decision making. Appropriate medical decision-making and tests are ordered as detailed in the plan of care above. Coding/billing information is submitted for this visit based on complexity of care and/or time spent for the visit. Time spent: 15 minutes  HPI: Jona Zambrano is a 45year old male who is calling about URI.  He just returned from [facility]. Started off with diarrhea twice and now having sore throat. He just did a covid test which was negative. Taking immodium and tylenol with some relief   ROS:  General: Feels well overall  Skin: Denies any unusual skin lesions  Eyes: Denies blurred vision or double vision  All systems negative, except for above  Physical:  GENERAL: Alert and oriented, well developed, well nourished,in no apparent distress  SKIN: no rashes,no suspicious lesions on face  LUNGS: no audible wheezing  PSYCH: pleasant, appropriate mood and affect    Assessment and Plan  (J06.9) Upper respiratory tract infection, unspecified type  (primary encounter diagnosis)  Plan: continue supportive treatment. Okay to repeat covid test again in a few days.  Follow up in 7 to 10 days or sooner if symptoms do not improve or worsen   Kisha Beaver DO

## 2023-02-15 ENCOUNTER — PATIENT MESSAGE (OUTPATIENT)
Dept: INTERNAL MEDICINE CLINIC | Facility: CLINIC | Age: 53
End: 2023-02-15

## 2023-02-15 DIAGNOSIS — J06.9 UPPER RESPIRATORY TRACT INFECTION, UNSPECIFIED TYPE: Primary | ICD-10-CM

## 2023-02-15 NOTE — TELEPHONE ENCOUNTER
Please advise-TY! PCR pended-review and sign if appropriate. Any further recs? Telemedicine visit 2/14/2023:  J06.9) Upper respiratory tract infection, unspecified type  (primary encounter diagnosis)  Plan: continue supportive treatment. Okay to repeat covid test again in a few days.  Follow up in 7 to 10 days or sooner if symptoms do not improve or worsen   Kisha Waddell DO

## 2023-02-15 NOTE — TELEPHONE ENCOUNTER
From: Whitney Aparicio  To: [facility],   Sent: 2/15/2023 11:39 AM CST  Subject: Question related to Covid test..    So today I feel little feverish, and have not gone to bathroom since yesterday little constipated. I stopped taking Imodium since yesterday, taking Tylenol. Do I need a PCR test, anything else u can suggest to take? I am taking zinc lozenges for throat. Feeling tired as energy level is low.

## 2023-02-16 ENCOUNTER — LAB ENCOUNTER (OUTPATIENT)
Dept: LAB | Age: 53
End: 2023-02-16
Attending: INTERNAL MEDICINE
Payer: MEDICAID

## 2023-02-16 DIAGNOSIS — J06.9 UPPER RESPIRATORY TRACT INFECTION, UNSPECIFIED TYPE: ICD-10-CM

## 2023-02-16 PROCEDURE — 87637 SARSCOV2&INF A&B&RSV AMP PRB: CPT

## 2023-02-17 ENCOUNTER — PATIENT MESSAGE (OUTPATIENT)
Dept: INTERNAL MEDICINE CLINIC | Facility: CLINIC | Age: 53
End: 2023-02-17

## 2023-02-17 LAB
FLUAV + FLUBV RNA SPEC NAA+PROBE: NOT DETECTED
FLUAV + FLUBV RNA SPEC NAA+PROBE: NOT DETECTED
RSV RNA SPEC NAA+PROBE: NOT DETECTED
SARS-COV-2 RNA RESP QL NAA+PROBE: DETECTED

## 2023-02-18 NOTE — TELEPHONE ENCOUNTER
From: Leopoldo Litten  To: Susana George DO  Sent: 2/17/2023 3:41 PM CST  Subject: Question regarding HKIS-MTO-32-ALINITY    Well my symptoms have not improved, been coughing with mucus in throat and mucus from nose all last night. I had body ache, fever cold chills yesterday. Today, runny nose and weakness, and body ache continues. If there is no medication, what can be done to reduce these symptoms.

## 2023-02-20 NOTE — TELEPHONE ENCOUNTER
Please further advise-should he be seen in immediate care? Too late for Paxlovid as s/s started over 5 days ago.

## 2023-02-22 RX ORDER — AMOXICILLIN AND CLAVULANATE POTASSIUM 500; 125 MG/1; MG/1
1 TABLET, FILM COATED ORAL 2 TIMES DAILY
Qty: 20 TABLET | Refills: 0 | Status: SHIPPED | OUTPATIENT
Start: 2023-02-22 | End: 2023-03-04

## 2023-02-22 NOTE — TELEPHONE ENCOUNTER
Appt scheduled. Future Appointments   Date Time Provider Ion Salas   2/23/2023 12:45 PM Gretchen Baca MD [facility]   3/22/2023  2:30 PM Juan Alberto Barrett APRN [facility] [place]     Patient would like to know if Dr. Megan Arthur can send in a prescription for an antibiotic. Positive for covid 02/14  cough, mucus, sore throat, L ear clogged, losing voice.

## 2023-02-22 NOTE — TELEPHONE ENCOUNTER
Spoke with patient, reports cough, congestion, sore throat, L ear clogged, hoarse voice. States he has tried Tylenol, OTC cold/cough meds without relief. Denies fever/cp/sob. He was scheduled for a phone visit with  for tomorrow but would like an abx sent in today due to not feeling well and would like to get started with tx. Seen for telemedicine visit with  on 2/14. Pt sent [facility] to  2 days ago. Declined going to IC as recommended by  as he does not think that is needed. Please advise. Will you prescribe abx prior to telephone visit tomorrow or should he wait until tomorrow?

## 2023-02-23 ENCOUNTER — VIRTUAL PHONE E/M (OUTPATIENT)
Dept: INTERNAL MEDICINE CLINIC | Facility: CLINIC | Age: 53
End: 2023-02-23
Payer: MEDICAID

## 2023-02-23 DIAGNOSIS — R09.81 SINUS CONGESTION: ICD-10-CM

## 2023-02-23 DIAGNOSIS — U07.1 COVID-19: Primary | ICD-10-CM

## 2023-02-23 PROCEDURE — 99213 OFFICE O/P EST LOW 20 MIN: CPT | Performed by: INTERNAL MEDICINE

## 2023-02-23 RX ORDER — GUAIFENESIN AND DEXTROMETHORPHAN HYDROBROMIDE 1200; 60 MG/1; MG/1
1 TABLET, EXTENDED RELEASE ORAL EVERY 12 HOURS
Qty: 20 TABLET | Refills: 0 | Status: SHIPPED | OUTPATIENT
Start: 2023-02-23 | End: 2023-03-05

## 2023-02-23 NOTE — PROGRESS NOTES
Virtual Telephone Check-In    Reymundo Connors verbally consents to a Virtual/Telephone Check-In visit on 02/23/23. Patient has been referred to the [facility] website at [email or web address] to review the yearly Consent to Treat document. Patient understands and accepts financial responsibility for any deductible, co-insurance and/or co-pays associated with this service. Duration of the service: 13 minutes      Summary of topics discussed: Patient returned from [facility] about 10 days ago was COVID-positive 7 days ago currently feels fine however does feel pretty stuffy up in the nose with some postnasal drip. Patient was given antibiotics yesterday. Recommended that he continue saline nose flush as well as a decongestant which will be sent over. If he has no relief of symptoms he should see me for a follow-up. Diagnoses and all orders for this visit:    COVID-19    Sinus congestion  -     dextromethorphan-guaifenesin ER (MUCINEX DM MAXIMUM STRENGTH)  MG Oral Tablet 12 Hr; Take 1 tablet by mouth every 12 (twelve) hours for 10 days.           Crow De Oliveira MD

## 2023-02-27 ENCOUNTER — TELEPHONE (OUTPATIENT)
Dept: INTERNAL MEDICINE CLINIC | Facility: CLINIC | Age: 53
End: 2023-02-27

## 2023-02-27 NOTE — TELEPHONE ENCOUNTER
Kaylanex DM is not covered by insurance- patient told to get an alternative from the pharmacy to see if Dr. Oswald Tapia will change it to a medication that the insurance will cover.

## 2023-03-06 NOTE — TELEPHONE ENCOUNTER
VM - COVID symptoms not resolved. Could I schedule pt for phone visit tomorrow sometime? Please advise, ty!

## 2023-03-06 NOTE — TELEPHONE ENCOUNTER
[address] office, per , please schedule pt tomorrow for phone visit at 9:15 AM. Pt has been notified!  Ty!    [phone] spoke to pt - yes, he would like telephone visit with VM tomorrow at 9:15 AM.     Call [phone]

## 2023-03-07 ENCOUNTER — VIRTUAL PHONE E/M (OUTPATIENT)
Dept: INTERNAL MEDICINE CLINIC | Facility: CLINIC | Age: 53
End: 2023-03-07
Payer: MEDICAID

## 2023-03-07 DIAGNOSIS — R09.81 SINUS CONGESTION: ICD-10-CM

## 2023-03-07 DIAGNOSIS — U07.1 COVID-19: Primary | ICD-10-CM

## 2023-03-07 PROCEDURE — 99213 OFFICE O/P EST LOW 20 MIN: CPT | Performed by: INTERNAL MEDICINE

## 2023-03-07 RX ORDER — FLUTICASONE PROPIONATE 50 MCG
2 SPRAY, SUSPENSION (ML) NASAL DAILY
Qty: 11.1 ML | Refills: 0 | Status: SHIPPED | OUTPATIENT
Start: 2023-03-07 | End: 2024-03-01

## 2023-03-07 RX ORDER — ALBUTEROL SULFATE 90 UG/1
2 AEROSOL, METERED RESPIRATORY (INHALATION) EVERY 6 HOURS PRN
Qty: 8 G | Refills: 1 | Status: SHIPPED | OUTPATIENT
Start: 2023-03-07 | End: 2023-04-06

## 2023-03-07 RX ORDER — GUAIFENESIN AND DEXTROMETHORPHAN HYDROBROMIDE 1200; 60 MG/1; MG/1
1 TABLET, EXTENDED RELEASE ORAL EVERY 12 HOURS
Qty: 20 TABLET | Refills: 0 | Status: SHIPPED | OUTPATIENT
Start: 2023-03-07 | End: 2023-03-17

## 2023-03-07 NOTE — PROGRESS NOTES
Virtual Telephone Check-In    Jesus Payne verbally consents to a Virtual/Telephone Check-In visit on 03/07/23. Patient has been referred to the [facility] website at [email or web address] to review the yearly Consent to Treat document. Patient understands and accepts financial responsibility for any deductible, co-insurance and/or co-pays associated with this service. Duration of the service: 13 minutes      Summary of topics discussed: still has some post nasal drip  Stuffy nose occ wheeze  No fever or SOB        Diagnoses and all orders for this visit:    COVID-19    Sinus congestion  -     fluticasone propionate 50 MCG/ACT Nasal Suspension; 2 sprays by Each Nare route daily. -     albuterol 108 (90 Base) MCG/ACT Inhalation Aero Soln; Inhale 2 puffs into the lungs every 6 (six) hours as needed for Wheezing.  -     dextromethorphan-guaifenesin ER (MUCINEX DM MAXIMUM STRENGTH)  MG Oral Tablet 12 Hr; Take 1 tablet by mouth every 12 (twelve) hours for 10 days.       If no better -needs in office visit     Shellie Rogers MD

## 2023-04-03 DIAGNOSIS — E55.9 VITAMIN D DEFICIENCY: ICD-10-CM

## 2023-04-03 RX ORDER — ERGOCALCIFEROL 1.25 MG/1
50000 CAPSULE ORAL WEEKLY
Qty: 8 CAPSULE | Refills: 0 | Status: SHIPPED | OUTPATIENT
Start: 2023-04-03

## 2023-04-03 NOTE — TELEPHONE ENCOUNTER
In  to see mom and infant for the first time. Experienced mom stated that infant has been latching and nursing well. Infant was asleep on mom's chest during my visit. Reviewed the expectations of the first 24 hours as well as the second night. Mom and infant are anticipating an early discharge this evening. Reviewed discharge information as well. Encouraged mom to follow up with lactation consultant as needed. LOV: 3/7/2023 with Dr. Jaimie Trujillo  RTC: \"If no better -needs in office visit\"  Last Relevant Labs: 1/14/2023  Filled: 1/30/2023    #8 with 0 refills    Future Appointments   Date Time Provider Ion Salas   6/21/2023  2:30 PM MICHAEL Bess [facility] [place]

## 2023-04-11 DIAGNOSIS — E78.00 HYPERCHOLESTEREMIA: Chronic | ICD-10-CM

## 2023-04-11 RX ORDER — GLIPIZIDE 5 MG/1
TABLET ORAL
Qty: 180 TABLET | Refills: 1 | Status: SHIPPED | OUTPATIENT
Start: 2023-04-11

## 2023-04-11 RX ORDER — ATORVASTATIN CALCIUM 20 MG/1
TABLET, FILM COATED ORAL
Qty: 90 TABLET | Refills: 1 | Status: SHIPPED | OUTPATIENT
Start: 2023-04-11

## 2023-04-12 RX ORDER — ASPIRIN 81 MG/1
TABLET, COATED ORAL
Qty: 90 TABLET | Refills: 1 | Status: SHIPPED | OUTPATIENT
Start: 2023-04-12

## 2023-06-14 DIAGNOSIS — E55.9 VITAMIN D DEFICIENCY: ICD-10-CM

## 2023-06-15 RX ORDER — ERGOCALCIFEROL 1.25 MG/1
CAPSULE ORAL
Qty: 8 CAPSULE | Refills: 0 | Status: SHIPPED | OUTPATIENT
Start: 2023-06-15

## 2023-07-10 DIAGNOSIS — E11.65 TYPE 2 DIABETES MELLITUS WITH HYPERGLYCEMIA, WITHOUT LONG-TERM CURRENT USE OF INSULIN (HCC): ICD-10-CM

## 2023-08-09 DIAGNOSIS — E11.65 TYPE 2 DIABETES MELLITUS WITH HYPERGLYCEMIA, WITHOUT LONG-TERM CURRENT USE OF INSULIN (HCC): ICD-10-CM

## 2023-08-09 NOTE — TELEPHONE ENCOUNTER
Requested Prescriptions     Pending Prescriptions Disp Refills    JARDIANCE 10 MG Oral Tab [Pharmacy Med Name: Tacey Ogles 10MG TABLETS] 90 tablet 1     Sig: TAKE 1 TABLET(10 MG) BY MOUTH DAILY    METFORMIN HCL 1000 MG Oral Tab [Pharmacy Med Name: METFORMIN 1000MG TABLETS] 60 tablet 0     Sig: TAKE 1 TABLET(1000 MG) BY MOUTH TWICE DAILY WITH MEALS     Your appointments       Date & Time Appointment Department [facility])    Oct 11, 2023  2:00 PM CDT Diabetes Pump follow up with Laurita Munguia, [address] [place] [address] Dr Jones, [facility]  [address] [facility] [facility] [address]  [facility] [id number] [phone]          Last A1c value was 7.7% done 12/7/2022.     Refill 7/10/23  LOV 12/07/22    eGFR-Cr  >=60 mL/min/1.73m2 99

## 2023-08-18 DIAGNOSIS — E55.9 VITAMIN D DEFICIENCY: ICD-10-CM

## 2023-08-21 RX ORDER — ERGOCALCIFEROL 1.25 MG/1
CAPSULE ORAL
Qty: 8 CAPSULE | Refills: 0 | OUTPATIENT
Start: 2023-08-21

## 2023-09-20 ENCOUNTER — TELEPHONE (OUTPATIENT)
Dept: INTERNAL MEDICINE CLINIC | Facility: CLINIC | Age: 53
End: 2023-09-20

## 2023-10-08 DIAGNOSIS — E78.00 HYPERCHOLESTEREMIA: Chronic | ICD-10-CM

## 2023-10-09 ENCOUNTER — LAB ENCOUNTER (OUTPATIENT)
Dept: LAB | Age: 53
End: 2023-10-09
Attending: NURSE PRACTITIONER
Payer: MEDICAID

## 2023-10-09 DIAGNOSIS — E11.65 TYPE 2 DIABETES MELLITUS WITH HYPERGLYCEMIA, WITHOUT LONG-TERM CURRENT USE OF INSULIN (HCC): ICD-10-CM

## 2023-10-09 LAB
ALBUMIN SERPL-MCNC: 3.9 G/DL (ref 3.4–5)
ALBUMIN/GLOB SERPL: 1 {RATIO} (ref 1–2)
ALP LIVER SERPL-CCNC: 102 U/L
ALT SERPL-CCNC: 34 U/L
ANION GAP SERPL CALC-SCNC: 3 MMOL/L (ref 0–18)
AST SERPL-CCNC: 14 U/L (ref 15–37)
BILIRUB SERPL-MCNC: 0.8 MG/DL (ref 0.1–2)
BUN BLD-MCNC: 16 MG/DL (ref 7–18)
CALCIUM BLD-MCNC: 9.2 MG/DL (ref 8.5–10.1)
CHLORIDE SERPL-SCNC: 109 MMOL/L (ref 98–112)
CO2 SERPL-SCNC: 23 MMOL/L (ref 21–32)
CREAT BLD-MCNC: 1.13 MG/DL
CREAT UR-SCNC: 70 MG/DL
EGFRCR SERPLBLD CKD-EPI 2021: 77 ML/MIN/1.73M2 (ref 60–?)
EST. AVERAGE GLUCOSE BLD GHB EST-MCNC: 197 MG/DL (ref 68–126)
FASTING STATUS PATIENT QL REPORTED: YES
GLOBULIN PLAS-MCNC: 3.8 G/DL (ref 2.8–4.4)
GLUCOSE BLD-MCNC: 146 MG/DL (ref 70–99)
HBA1C MFR BLD: 8.5 % (ref ?–5.7)
MICROALBUMIN UR-MCNC: 20.1 MG/DL
MICROALBUMIN/CREAT 24H UR-RTO: 287.1 UG/MG (ref ?–30)
OSMOLALITY SERPL CALC.SUM OF ELEC: 284 MOSM/KG (ref 275–295)
POTASSIUM SERPL-SCNC: 4.5 MMOL/L (ref 3.5–5.1)
PROT SERPL-MCNC: 7.7 G/DL (ref 6.4–8.2)
SODIUM SERPL-SCNC: 135 MMOL/L (ref 136–145)

## 2023-10-09 PROCEDURE — 82570 ASSAY OF URINE CREATININE: CPT

## 2023-10-09 PROCEDURE — 80053 COMPREHEN METABOLIC PANEL: CPT

## 2023-10-09 PROCEDURE — 83036 HEMOGLOBIN GLYCOSYLATED A1C: CPT

## 2023-10-09 PROCEDURE — 3060F POS MICROALBUMINURIA REV: CPT | Performed by: INTERNAL MEDICINE

## 2023-10-09 PROCEDURE — 82043 UR ALBUMIN QUANTITATIVE: CPT

## 2023-10-09 PROCEDURE — 3061F NEG MICROALBUMINURIA REV: CPT | Performed by: INTERNAL MEDICINE

## 2023-10-09 PROCEDURE — 3052F HG A1C>EQUAL 8.0%<EQUAL 9.0%: CPT | Performed by: INTERNAL MEDICINE

## 2023-10-09 PROCEDURE — 36415 COLL VENOUS BLD VENIPUNCTURE: CPT

## 2023-10-09 RX ORDER — ATORVASTATIN CALCIUM 20 MG/1
TABLET, FILM COATED ORAL
Qty: 90 TABLET | Refills: 0 | Status: SHIPPED | OUTPATIENT
Start: 2023-10-09

## 2023-10-09 RX ORDER — GLIPIZIDE 5 MG/1
TABLET ORAL
Qty: 60 TABLET | Refills: 0 | Status: SHIPPED | OUTPATIENT
Start: 2023-10-09

## 2023-10-09 RX ORDER — ASPIRIN 81 MG/1
81 TABLET ORAL DAILY
Qty: 90 TABLET | Refills: 1 | Status: SHIPPED | OUTPATIENT
Start: 2023-10-09

## 2023-10-09 RX ORDER — GLIPIZIDE 5 MG/1
TABLET ORAL
Qty: 180 TABLET | Refills: 0 | OUTPATIENT
Start: 2023-10-09

## 2023-10-09 NOTE — TELEPHONE ENCOUNTER
Requested Prescriptions     Pending Prescriptions Disp Refills    GLIPIZIDE 5 MG Oral Tab [Pharmacy Med Name: GLIPIZIDE 5MG TABLETS] 180 tablet 1     Sig: TAKE 1 TABLET BY MOUTH TWICE DAILY    ATORVASTATIN 20 MG Oral Tab [Pharmacy Med Name: ATORVASTATIN 20MG TABLETS] 90 tablet 1     Sig: TAKE 1 TABLET(20 MG) BY MOUTH EVERY NIGHT     Future Appointments   Date Time Provider Ion Salas   10/11/2023  2:00 PM MICHAEL Massey [facility] [place]     Last A1c value was 7.7% done 12/7/2022.   Refill 4/11/23  LOV 12/7/22

## 2023-10-09 NOTE — TELEPHONE ENCOUNTER
Cpx scheduled with patient for 11/16    Aspirin 81mg     LOV: 1/11/23   RTC: 6 weeks   Filled: 4/12/23 # 90 1 refills   Labs: 1/14/23   Future Appointments   Date Time Provider Ion Salas   10/9/2023 12:00 PM REF BBK REF EMG8 Ref BBK 8   10/11/2023  2:00 PM MICHAEL Hernandez [facility] [place]   11/16/2023  3:00 PM Samir Guerin MD EM[facility] 8 [facility] [place]

## 2023-10-11 ENCOUNTER — OFFICE VISIT (OUTPATIENT)
Dept: ENDOCRINOLOGY CLINIC | Facility: CLINIC | Age: 53
End: 2023-10-11
Payer: MEDICAID

## 2023-10-11 VITALS
OXYGEN SATURATION: 98 % | RESPIRATION RATE: 18 BRPM | BODY MASS INDEX: 34 KG/M2 | WEIGHT: 228.19 LBS | HEART RATE: 100 BPM | SYSTOLIC BLOOD PRESSURE: 122 MMHG | DIASTOLIC BLOOD PRESSURE: 74 MMHG

## 2023-10-11 DIAGNOSIS — E11.21 DIABETIC NEPHROPATHY ASSOCIATED WITH TYPE 2 DIABETES MELLITUS (HCC): ICD-10-CM

## 2023-10-11 DIAGNOSIS — E11.65 TYPE 2 DIABETES MELLITUS WITH HYPERGLYCEMIA, WITHOUT LONG-TERM CURRENT USE OF INSULIN (HCC): ICD-10-CM

## 2023-10-11 DIAGNOSIS — E78.5 DYSLIPIDEMIA: ICD-10-CM

## 2023-10-11 DIAGNOSIS — E66.9 OBESITY (BMI 30-39.9): ICD-10-CM

## 2023-10-11 DIAGNOSIS — I10 ESSENTIAL HYPERTENSION: Primary | ICD-10-CM

## 2023-10-11 PROCEDURE — 99215 OFFICE O/P EST HI 40 MIN: CPT | Performed by: NURSE PRACTITIONER

## 2023-10-11 PROCEDURE — 3074F SYST BP LT 130 MM HG: CPT | Performed by: NURSE PRACTITIONER

## 2023-10-11 PROCEDURE — 95251 CONT GLUC MNTR ANALYSIS I&R: CPT | Performed by: NURSE PRACTITIONER

## 2023-10-11 PROCEDURE — 3078F DIAST BP <80 MM HG: CPT | Performed by: NURSE PRACTITIONER

## 2023-10-11 RX ORDER — DULAGLUTIDE 0.75 MG/.5ML
0.75 INJECTION, SOLUTION SUBCUTANEOUS WEEKLY
Qty: 2 ML | Refills: 0 | Status: SHIPPED | OUTPATIENT
Start: 2023-10-11 | End: 2023-10-11

## 2023-10-11 RX ORDER — GLIPIZIDE 5 MG/1
TABLET ORAL
Qty: 90 TABLET | Refills: 0 | Status: SHIPPED | OUTPATIENT
Start: 2023-10-11

## 2023-10-11 RX ORDER — DULAGLUTIDE 0.75 MG/.5ML
0.75 INJECTION, SOLUTION SUBCUTANEOUS WEEKLY
Qty: 2 ML | Refills: 1 | Status: SHIPPED | OUTPATIENT
Start: 2023-10-11

## 2023-10-31 ENCOUNTER — TELEPHONE (OUTPATIENT)
Dept: ENDOCRINOLOGY CLINIC | Facility: CLINIC | Age: 53
End: 2023-10-31

## 2023-10-31 NOTE — TELEPHONE ENCOUNTER
Received PA for patient Dk sensor at Rx that was sent pharmacy note states patient paying in cash should not need PA contacted pharmacy to inform

## 2023-11-08 DIAGNOSIS — R09.81 SINUS CONGESTION: ICD-10-CM

## 2023-11-09 DIAGNOSIS — R09.81 SINUS CONGESTION: ICD-10-CM

## 2023-11-09 RX ORDER — FLUTICASONE PROPIONATE 50 MCG
2 SPRAY, SUSPENSION (ML) NASAL DAILY
Qty: 16 G | Refills: 0 | Status: SHIPPED | OUTPATIENT
Start: 2023-11-09

## 2023-11-10 RX ORDER — FLUTICASONE PROPIONATE 50 MCG
2 SPRAY, SUSPENSION (ML) NASAL DAILY
Qty: 48 G | Refills: 0 | OUTPATIENT
Start: 2023-11-10

## 2023-11-16 ENCOUNTER — OFFICE VISIT (OUTPATIENT)
Dept: INTERNAL MEDICINE CLINIC | Facility: CLINIC | Age: 53
End: 2023-11-16
Payer: MEDICAID

## 2023-11-16 VITALS
SYSTOLIC BLOOD PRESSURE: 160 MMHG | TEMPERATURE: 98 F | HEART RATE: 110 BPM | BODY MASS INDEX: 34.57 KG/M2 | WEIGHT: 233.38 LBS | OXYGEN SATURATION: 98 % | RESPIRATION RATE: 16 BRPM | DIASTOLIC BLOOD PRESSURE: 110 MMHG | HEIGHT: 69 IN

## 2023-11-16 DIAGNOSIS — I10 ESSENTIAL HYPERTENSION: Primary | Chronic | ICD-10-CM

## 2023-11-16 DIAGNOSIS — E78.00 HYPERCHOLESTEREMIA: Chronic | ICD-10-CM

## 2023-11-16 DIAGNOSIS — E11.21 DIABETIC NEPHROPATHY ASSOCIATED WITH TYPE 2 DIABETES MELLITUS (HCC): Chronic | ICD-10-CM

## 2023-11-16 DIAGNOSIS — Z12.11 SCREEN FOR COLON CANCER: ICD-10-CM

## 2023-11-16 DIAGNOSIS — Z00.00 BLOOD TESTS FOR ROUTINE GENERAL PHYSICAL EXAMINATION: ICD-10-CM

## 2023-11-16 PROBLEM — R80.8 OTHER PROTEINURIA: Chronic | Status: ACTIVE | Noted: 2020-11-03

## 2023-11-16 PROCEDURE — 3077F SYST BP >= 140 MM HG: CPT | Performed by: INTERNAL MEDICINE

## 2023-11-16 PROCEDURE — 90471 IMMUNIZATION ADMIN: CPT | Performed by: INTERNAL MEDICINE

## 2023-11-16 PROCEDURE — 3080F DIAST BP >= 90 MM HG: CPT | Performed by: INTERNAL MEDICINE

## 2023-11-16 PROCEDURE — 93000 ELECTROCARDIOGRAM COMPLETE: CPT | Performed by: INTERNAL MEDICINE

## 2023-11-16 PROCEDURE — 90686 IIV4 VACC NO PRSV 0.5 ML IM: CPT | Performed by: INTERNAL MEDICINE

## 2023-11-16 PROCEDURE — 3008F BODY MASS INDEX DOCD: CPT | Performed by: INTERNAL MEDICINE

## 2023-11-16 PROCEDURE — 99213 OFFICE O/P EST LOW 20 MIN: CPT | Performed by: INTERNAL MEDICINE

## 2023-11-16 RX ORDER — AMLODIPINE BESYLATE 5 MG/1
5 TABLET ORAL DAILY
Qty: 30 TABLET | Refills: 2 | Status: SHIPPED | OUTPATIENT
Start: 2023-11-16 | End: 2024-02-14

## 2023-11-17 LAB
ATRIAL RATE: 102 BPM
P AXIS: 46 DEGREES
P-R INTERVAL: 156 MS
Q-T INTERVAL: 334 MS
QRS DURATION: 72 MS
QTC CALCULATION (BEZET): 435 MS
R AXIS: 7 DEGREES
T AXIS: 27 DEGREES
VENTRICULAR RATE: 102 BPM

## 2023-11-20 RX ORDER — GLIPIZIDE 5 MG/1
TABLET ORAL
Qty: 90 TABLET | Refills: 0 | Status: SHIPPED | OUTPATIENT
Start: 2023-11-20

## 2023-11-20 NOTE — TELEPHONE ENCOUNTER
Requested Prescriptions     Pending Prescriptions Disp Refills    glipiZIDE 5 MG Oral Tab [Pharmacy Med Name: GLIPIZIDE 5MG TABLETS] 60 tablet 0     Sig: TAKE 1 TABLET BY MOUTH TWICE DAILY     Future Appointments   Date Time Provider Ion Maritza   12/14/2023  2:30 PM MICHAEL Cody [facility] [address]   2/14/2024  3:15 PM MICHAEL Cody [facility] [place]     Last A1c value was 8.5% done 10/9/2023.   Refill 10/11/23  LOV 10/11/23

## 2023-12-07 DIAGNOSIS — E11.65 TYPE 2 DIABETES MELLITUS WITH HYPERGLYCEMIA, WITHOUT LONG-TERM CURRENT USE OF INSULIN (HCC): Primary | ICD-10-CM

## 2024-01-06 DIAGNOSIS — E78.00 HYPERCHOLESTEREMIA: Chronic | ICD-10-CM

## 2024-01-08 RX ORDER — ATORVASTATIN CALCIUM 20 MG/1
20 TABLET, FILM COATED ORAL DAILY
Qty: 90 TABLET | Refills: 2 | Status: SHIPPED | OUTPATIENT
Start: 2024-01-08

## 2024-01-08 NOTE — TELEPHONE ENCOUNTER
Requested Prescriptions     Pending Prescriptions Disp Refills    ATORVASTATIN 20 MG Oral Tab [Pharmacy Med Name: ATORVASTATIN 20MG TABLETS] 90 tablet 0     Sig: TAKE 1 TABLET(20 MG) BY MOUTH EVERY NIGHT     Your appointments       Date & Time Appointment Department ([place])    Feb 14, 2024  3:15 PM CST Diabetes Pump follow up with Romana Barrett APRN [facility] ([facility])              [facility]  [address]  [facility] [id number]  [phone]          HGBA1C:    Lab Results   Component Value Date    A1C 8.5 (H) 10/09/2023    A1C 7.7 (A) 12/07/2022    A1C 8.1 (A) 10/05/2022     (H) 10/09/2023     LOV: 10-11-23    REFILL: 10-9-23

## 2024-01-09 RX ORDER — VALSARTAN 160 MG/1
160 TABLET ORAL DAILY
Qty: 90 TABLET | Refills: 3 | Status: SHIPPED | OUTPATIENT
Start: 2024-01-09

## 2024-01-09 NOTE — TELEPHONE ENCOUNTER
Valsartan 160mg     LOV: 11/16/23   RTC: 5 weeks   Labs: 10/9/23   Filled: 1/23/23 # 90 3 refills   Future Appointments   Date Time Provider Department Center   2/14/2024  3:15 PM Romana Barrett APRN [facility] [place]

## 2024-01-11 ENCOUNTER — LAB ENCOUNTER (OUTPATIENT)
Dept: LAB | Age: 54
End: 2024-01-11
Attending: NURSE PRACTITIONER
Payer: MEDICAID

## 2024-01-11 DIAGNOSIS — Z00.00 BLOOD TESTS FOR ROUTINE GENERAL PHYSICAL EXAMINATION: ICD-10-CM

## 2024-01-11 DIAGNOSIS — E11.65 TYPE 2 DIABETES MELLITUS WITH HYPERGLYCEMIA, WITHOUT LONG-TERM CURRENT USE OF INSULIN (HCC): ICD-10-CM

## 2024-01-11 LAB
BASOPHILS # BLD AUTO: 0.05 X10(3) UL (ref 0–0.2)
BASOPHILS NFR BLD AUTO: 0.8 %
BILIRUB UR QL STRIP.AUTO: NEGATIVE
CHOLEST SERPL-MCNC: 146 MG/DL (ref ?–200)
CLARITY UR REFRACT.AUTO: CLEAR
COMPLEXED PSA SERPL-MCNC: 0.32 NG/ML (ref ?–4)
EOSINOPHIL # BLD AUTO: 0.31 X10(3) UL (ref 0–0.7)
EOSINOPHIL NFR BLD AUTO: 4.8 %
ERYTHROCYTE [DISTWIDTH] IN BLOOD BY AUTOMATED COUNT: 13.7 %
FASTING PATIENT LIPID ANSWER: YES
GLUCOSE UR STRIP.AUTO-MCNC: >1000 MG/DL
HCT VFR BLD AUTO: 51.6 %
HDLC SERPL-MCNC: 49 MG/DL (ref 40–59)
HGB BLD-MCNC: 17.1 G/DL
IMM GRANULOCYTES # BLD AUTO: 0.01 X10(3) UL (ref 0–1)
IMM GRANULOCYTES NFR BLD: 0.2 %
KETONES UR STRIP.AUTO-MCNC: NEGATIVE MG/DL
LDLC SERPL CALC-MCNC: 82 MG/DL (ref ?–100)
LEUKOCYTE ESTERASE UR QL STRIP.AUTO: NEGATIVE
LYMPHOCYTES # BLD AUTO: 1.83 X10(3) UL (ref 1–4)
LYMPHOCYTES NFR BLD AUTO: 28.2 %
MCH RBC QN AUTO: 28.6 PG (ref 26–34)
MCHC RBC AUTO-ENTMCNC: 33.1 G/DL (ref 31–37)
MCV RBC AUTO: 86.3 FL
MONOCYTES # BLD AUTO: 0.48 X10(3) UL (ref 0.1–1)
MONOCYTES NFR BLD AUTO: 7.4 %
NEUTROPHILS # BLD AUTO: 3.81 X10 (3) UL (ref 1.5–7.7)
NEUTROPHILS # BLD AUTO: 3.81 X10(3) UL (ref 1.5–7.7)
NEUTROPHILS NFR BLD AUTO: 58.6 %
NITRITE UR QL STRIP.AUTO: NEGATIVE
NONHDLC SERPL-MCNC: 97 MG/DL (ref ?–130)
PH UR STRIP.AUTO: 5 [PH] (ref 5–8)
PLATELET # BLD AUTO: 188 10(3)UL (ref 150–450)
RBC # BLD AUTO: 5.98 X10(6)UL
RBC UR QL AUTO: NEGATIVE
SP GR UR STRIP.AUTO: >1.03 (ref 1–1.03)
T4 SERPL-MCNC: 7.5 UG/DL
TRIGL SERPL-MCNC: 77 MG/DL (ref 30–149)
TSI SER-ACNC: 4.38 MIU/ML (ref 0.36–3.74)
UROBILINOGEN UR STRIP.AUTO-MCNC: NORMAL MG/DL
VIT D+METAB SERPL-MCNC: 18 NG/ML (ref 30–100)
VLDLC SERPL CALC-MCNC: 12 MG/DL (ref 0–30)
WBC # BLD AUTO: 6.5 X10(3) UL (ref 4–11)

## 2024-01-11 PROCEDURE — 82306 VITAMIN D 25 HYDROXY: CPT

## 2024-01-11 PROCEDURE — 85025 COMPLETE CBC W/AUTO DIFF WBC: CPT

## 2024-01-11 PROCEDURE — 84443 ASSAY THYROID STIM HORMONE: CPT

## 2024-01-11 PROCEDURE — 81003 URINALYSIS AUTO W/O SCOPE: CPT

## 2024-01-11 PROCEDURE — 84436 ASSAY OF TOTAL THYROXINE: CPT

## 2024-01-11 PROCEDURE — 80061 LIPID PANEL: CPT

## 2024-01-11 PROCEDURE — 36415 COLL VENOUS BLD VENIPUNCTURE: CPT

## 2024-02-14 ENCOUNTER — OFFICE VISIT (OUTPATIENT)
Dept: ENDOCRINOLOGY CLINIC | Facility: CLINIC | Age: 54
End: 2024-02-14
Payer: MEDICAID

## 2024-02-14 VITALS
BODY MASS INDEX: 34 KG/M2 | WEIGHT: 228.38 LBS | SYSTOLIC BLOOD PRESSURE: 116 MMHG | DIASTOLIC BLOOD PRESSURE: 70 MMHG | HEART RATE: 86 BPM | OXYGEN SATURATION: 98 % | RESPIRATION RATE: 16 BRPM

## 2024-02-14 DIAGNOSIS — E11.65 TYPE 2 DIABETES MELLITUS WITH HYPERGLYCEMIA, WITHOUT LONG-TERM CURRENT USE OF INSULIN (HCC): Primary | ICD-10-CM

## 2024-02-14 DIAGNOSIS — E11.21 DIABETIC NEPHROPATHY ASSOCIATED WITH TYPE 2 DIABETES MELLITUS (HCC): Chronic | ICD-10-CM

## 2024-02-14 DIAGNOSIS — I10 ESSENTIAL HYPERTENSION: ICD-10-CM

## 2024-02-14 DIAGNOSIS — E78.5 DYSLIPIDEMIA: ICD-10-CM

## 2024-02-14 DIAGNOSIS — E66.9 OBESITY (BMI 30-39.9): ICD-10-CM

## 2024-02-14 LAB
CARTRIDGE LOT#: 990 NUMERIC
HEMOGLOBIN A1C: 7.1 % (ref 4.3–5.6)

## 2024-02-14 RX ORDER — BLOOD-GLUCOSE SENSOR
1 EACH MISCELLANEOUS
Qty: 2 EACH | Refills: 3 | Status: SHIPPED | OUTPATIENT
Start: 2024-02-14

## 2024-02-14 RX ORDER — DULAGLUTIDE 0.75 MG/.5ML
0.75 INJECTION, SOLUTION SUBCUTANEOUS WEEKLY
Qty: 2 ML | Refills: 3 | Status: SHIPPED | OUTPATIENT
Start: 2024-02-14

## 2024-02-14 NOTE — PATIENT INSTRUCTIONS
A1C 7.1% (down from 8.5% )   Update urine protein test for diabetes kidney stress check in next week - order is at lab       Continue       Glipizide 5m tab twice  daily   jardiance 10mg once daily   Metformin 1000mg twice daily    Trulicity 0.75mg subcutaneous once weekly          American Diabetes Association: blood sugar targets:     Fasting blood sugar (before breakfast) Target:    (ideally less than 110)  2 hours after eating less than 180 (ideally less than  150 )     Call for blood sugars less than  75 or greater than  200 more than 2 times in a week       Watch for low blood sugars: (less than 70 )    Treatment of Low Blood Glucose Action Plan  1. Check blood glucose to be sure that it is low. You cannot  always go by symptoms or how you feel. If in doubt, treat your low blood glucose anyway.  Rule of 15 :     2. Take 15 grams of carbohydrate (carb). Here are some choices:    4 oz. regular fruit juice  3-4 glucose tablets  6 oz. regular soda   7-8 jelly beans    3. Recheck blood glucose after 10-15 minutes. If blood glucose is still low (less than 70 mg/dl) repeat the treatment (step 2).    4. If your next meal is more than one hour away, eat a small snack.    5. If you’re not sure what caused your low blood glucose, call your healthcare provider.    6. Always check your blood glucose before you drive           We will change to dk 3   Dk 3 sensor  This sensor offers real time glucose readings every minute to your smart phone and can be easily viewed with a quick glance.       Download the Freestyle dk 3 ever ; this is required to start ( or activate) the sensor, receive the blood sugar readings, alerts for highs or lows and review your blood sugar history     Clean the skin with alcohol. Allow the skin time to dry   Open the applicator: unscrew the cap from applicator : ONCE cap is removed: DO NOT put cap back on as it may damage the sensor   Place the sensor over the back of your upper  arm     To apply the sensor: press firmly and listen for a CLICK. Once you hear the click, pull back on the sensor applicator; the sensor should be attached to the skin on arm     Scan the sensor in the ever.    Apple phone: the reader is on top of phone  Androids: the reader is bottom of phone      You will see a check madi and message stating: sensor will be ready in 60 minutes : this is the warm  up period.After 60 minutes, the blood sugar readings will appear in the ever       The Dk continuous glucose sensor can only be worn on the back of your arm and is designed to last 14 days . The ever will notify you when it is time to remove the sensor    The reader needs to be within 30 feet of the sensor on your arm in order to detect a low or high blood sugar.       In the event that your Sensor stops working, you must use an alternate method to measure your glucose levels and inform your treatment decisions. (so keep your meter/testing supplies handy)     Remember to always have your next Sensor available before your current one ends so you can keep getting your glucose readings.    If the sensor falls off before  the 14 days, please call [employer] customer support to get a replacement. Your insurance will only pay for 2 sensors per month as well as your out of pocket cost for the sensor-        Your sensor is water resistant and can be worn while bathing, showering or swimming.It cannot be in water deeper than 3 feet or  under water for longer than 30 minutes. If it does become wet, please pat it dry.     There are bandages that are approved to wear over the sensor to help with keeping them on for 14 days- These are not covered by insurance and many times quite affordable on amazon       If you have to go for any xrays, MRI or CT scans, the sensor will have to be removed prior to the test.     Tips for removing the sensor  Pull up the edge of the adhesive that keeps your sensor attached to your skin. Slowly peel away  from your skin in one motion (like pulling a bandaid off)  Sometimes, applying warm soapy water or lotion can help loosen the tape around the sensor.   Note: Any remaining adhesive residue on the skin can be removed with warm soapy water or rubbing alcohol.         There are  differences between the interstitial fluid (where sensor is detecting blood sugar) and capillary blood (finger stick check of blood sugar) that may result in differences in  blood sugar readings between a sensor and results from a fingerstick test using a blood glucose meter. Differences in glucose readings between interstitial fluid (sensor) and capillary blood (fingerstick) may be observed during times of rapid change in blood sugar, such as after eating, dosing insulin, or exercising. There can be about a 5 minute delay for sensor to \"catch up\" which is why you may see some differences between sensor and finger stick blood sugar levels     Any issues please call our office or  [employer] support: [phone]    If you need more assistance; here is a link : https://www.freestyle.[email or web address]/us-en/freestyle-[email or web address]-resources.[email or web address]  the link will take you to freestyle website for  videos to watch

## 2024-02-14 NOTE — PROGRESS NOTES
No chief complaint on file.      Evangelina Hahn is a 53 year old presents today for diabetes management.   Primary care physician: Tico Tapia MD   Last appt with Diabetes center:  and trulicity was rx   Pt did not start until 2024   Does have some transient nausea but feeling it is improving  Has lost 5#       Most recent  A1C 7.1% %  ( last A1C 8.5 %)  XIMENA CGM Analysis of data:2024 thur 2024   Sensor download: full report in media  Average glucose: 149  mg/dl (last upload 181 mg/dl)       CV (coefficient of variation) : 25.6 %   GMI (estimated A1C) 6.9 %    22% time above 180mg /dl  ( last download: 41%)      0% time above 250 mg/dl ( last download: 5%)      78% time in target range:  mg/dl ( last download: 54%)      0% time below 70mg/dl ( last download: 0%)      0 %time below 54mg/dl ( last download: 0%)        Findings:   1. Pattern of hypoglycemia: none    2. Time in target: 78 % (ADA recommended goal > 70%)   3.  postprandial elevation most evident after dinner  but less time over 200mg/dl   4. Normal glucose variability            Diabetes History:  Type 2 DM ~    Patient has not had hospitalizations for blood sugar issues and denies any history of pancreatitis    Previous DM therapies:  januvia : lack of effect, change in therapy    Current DM Regimen:  Glipizide 5m tab twice  daily   jardiance 10mg once daily   Metformin 1000mg twice daily    Trulicity 0.75mg subcutaneous once weekly       HGBA1C:    Lab Results   Component Value Date    A1C 8.5 (H) 10/09/2023    A1C 7.7 (A) 2022    A1C 8.1 (A) 10/05/2022     (H) 10/09/2023       Lab Results   Component Value Date    CHOLEST 146 2024    CHOLEST 136 2023    TRIG 77 2024    TRIG 85 2023    HDL 49 2024    HDL 44 2023    LDL 82 2024    LDL 76 2023     Lab Results   Component Value Date    MICROALBCREA 287.1 (H) 10/09/2023    MICROALBCREA 350.6 (H) 10/05/2022       Lab Results   Component Value Date    CREATSERUM 1.13 10/09/2023    CREATSERUM 0.92 01/14/2023    EGFRCR 77 10/09/2023    EGFRCR 99 01/14/2023     Lab Results   Component Value Date    AST 14 (L) 10/09/2023    AST 19 01/14/2023    ALT 34 10/09/2023    ALT 32 01/14/2023       Lab Results   Component Value Date    TSH 4.380 (H) 01/11/2024    TSH 2.020 09/30/2021    T4F 0.9 01/10/2020           DM Complications:  Microvascular:   Neuropathy: no  Retinopathy: yes  Nephropathy: yes    Macrovascular:  PVD: no  CAD: no  Stroke/CVA: no    Modifying factors:  Medication adherence: yes   Wearing vicki CGM 2  Recent steroids, illness or infections: (past 90 d)  no     Allergies: Seasonal    Past Medical History:   Diagnosis Date    Chronic right shoulder pain 9/13/2018    Diabetes (HCC)     Esophageal reflux     Hyperlipidemia      No past surgical history on file.  Social History     Socioeconomic History    Marital status: Single   Tobacco Use    Smoking status: Never    Smokeless tobacco: Never   Vaping Use    Vaping Use: Never used   Substance and Sexual Activity    Alcohol use: No     Alcohol/week: 0.0 standard drinks of alcohol    Drug use: No   Other Topics Concern    Caffeine Concern Yes     Comment: 2 cups of tea daily    Exercise No     Family History   Problem Relation Age of Onset    Diabetes Father     Obesity Father     Diabetes Mother     Obesity Mother      Current Medication List:   Current Outpatient Medications   Medication Sig Dispense Refill    glipiZIDE 5 MG Oral Tab TAKE 1 TABLET DAILY 90 tablet 0    valsartan 160 MG Oral Tab Take 1 tablet (160 mg total) by mouth daily. 90 tablet 3    atorvastatin 20 MG Oral Tab Take 1 tablet (20 mg total) by mouth daily. 90 tablet 2    amLODIPine 5 MG Oral Tab Take 1 tablet (5 mg total) by mouth daily. 30 tablet 2    FLUTICASONE PROPIONATE 50 MCG/ACT Nasal Suspension SHAKE LIQUID AND USE 2 SPRAYS IN EACH NOSTRIL DAILY 16 g 0    Dulaglutide (TRULICITY) 0.75 MG/0.5ML  Subcutaneous Solution Pen-injector Inject 0.75 mg into the skin once a week. (Patient not taking: Reported on 11/16/2023) 2 mL 1    empagliflozin (JARDIANCE) 10 MG Oral Tab Take 1 tablet (10 mg total) by mouth daily. 90 tablet 1    Continuous Blood Gluc Sensor (FREESTYLE XIMENA 2 SENSOR) Does not apply Misc 1 each every 14 (fourteen) days. 2 each 5    metFORMIN HCl 1000 MG Oral Tab Take 1 tablet (1,000 mg total) by mouth 2 (two) times daily with meals. 180 tablet 1    aspirin (ASPIRIN LOW DOSE) 81 MG Oral Tab EC Take 1 tablet (81 mg total) by mouth daily. 90 tablet 1    dorzolamide 2 % Ophthalmic Solution Place 1 drop into both eyes in the morning and 1 drop before bedtime.      clotrimazole 1 % External Cream Apply 1 Application topically 2 (two) times daily. (Patient not taking: Reported on 11/16/2023) 14 g 3    triamcinolone 0.1 % External Cream Apply topically 2 (two) times daily as needed. (Patient not taking: Reported on 11/16/2023) 15 g 3    Glucose Blood (ACCU-CHEK GEORGETTE PLUS) In Vitro Strip TEST EVERY  strip 1    Azelastine HCl 0.1 % Nasal Solution 1 spray by Nasal route 2 (two) times daily. (Patient not taking: Reported on 11/16/2023) 30 mL 0    latanoprost 0.005 % Ophthalmic Solution Place 1 drop into both eyes nightly.         DM associated review of  symptoms:   Endocrine: Polyuria, polyphagia, polydipsia: no  Neurological: Paresthesias: no  HEENT: Blurred vision: no  Skin: No rash . No wounds  Hematological: Hypoglycemia: no     Review of Systems     LUNGS: denies shortness of breath   CARDIOVASCULAR: denies chest pain  GI: denies abdominal pain, nausea or diarrhea   : denies dysuria  MUSCULOSKELETAL: denies pain        Physical exam:  There were no vitals taken for this visit.  There is no height or weight on file to calculate BMI.    Physical Exam     Constitutional: Normal appearance   Cardiac:  Normal rate  Pulmonary/Chest: Effort normal  Neurological: Alert and oriented .   Psychiatric:  Normal mood and affect.       Assessment/Plan:  Nephropathy associated with T2 DM  Update level   Importance of DM control -   On SGLT 2 rx , arb rx       Hypertension   Well controlled  CPM       Dyslipidemia  Last labs --> reviewed today   Continue Atorvastatin       Obesity   BMI 33  Weight 228 lb ( last weight; :233  lb )   Continue lifestyle modications       Type  diabetes mellitus with hyperglycemia (HCC)  A1C: 7.1% (last A1C 8.5%)   GMI on vicki 6.9%   Weight 228  lb ( last weight : 233 lb)  Diabetes control is improving but needs ongoing management and evaluation  given the chronicity of Diabetes, ongoing medication monitoring and risk for complications     Upgrade vicki to vicki 3 cgm (( paying cash )     Continue   Metformin 1000mg twice daily   jardiance 10mg once daily    Glipizide  5mg twice daily     Trulicity 0.75mg once daily       Reminded of hydration importance w SGLT2i rx     Reviewed  clinical signifiance of A1c, adverse effects of suboptimal glucose control, and goals of therapy discussed with pt in detail  Continue lifestyle modifications: diet, carb controlling, physical activity since positive impact on BG trends/health -    Reminded pt on A1C and blood sugar targets (Fasting < 130 and post prandial <180 ) and complications associated with hyperglycemia and uncontrolled DM  Reviewed hypoglycemia signs/symptoms, treatment using the Rule of 15' and when to call DM center .  Ordered urine m/alb ; prefers to do next week at lab     Patient is asked to return in 3m . Also , recommended to contact DM clinic sooner if questions or concerns.    The patient indicates understanding of these issues and agrees to the plan.      No orders of the defined types were placed in this encounter.      DM quality  A1C/Blood pressure: as reported above   Nephropathy screening: labs  ++ urine m/alb   continue ace /arb rx.   LIPID screenin    . atorvastatin rx.   Last dilated eye exam: Last  Dilated Eye Exam: 09/19/23   Exam shows retinopathy? Eye Exam shows Diabetic Retinopathy?: Yes  Last diabetic foot exam: Last Foot Exam: 11/16/23    Defer foot exam to follow up appointment     The risks and benefits of my recommendations, as well as other treatment options were discussed with the patient today. questions were also answered to the best of my knowledge.

## 2024-02-15 ENCOUNTER — TELEPHONE (OUTPATIENT)
Dept: ENDOCRINOLOGY CLINIC | Facility: CLINIC | Age: 54
End: 2024-02-15

## 2024-02-22 ENCOUNTER — OFFICE VISIT (OUTPATIENT)
Dept: INTERNAL MEDICINE CLINIC | Facility: CLINIC | Age: 54
End: 2024-02-22
Payer: MEDICAID

## 2024-02-22 VITALS
HEIGHT: 69 IN | DIASTOLIC BLOOD PRESSURE: 86 MMHG | SYSTOLIC BLOOD PRESSURE: 140 MMHG | OXYGEN SATURATION: 98 % | RESPIRATION RATE: 14 BRPM | TEMPERATURE: 98 F | WEIGHT: 229 LBS | BODY MASS INDEX: 33.92 KG/M2 | HEART RATE: 106 BPM

## 2024-02-22 DIAGNOSIS — Z00.00 ROUTINE GENERAL MEDICAL EXAMINATION AT A HEALTH CARE FACILITY: Primary | ICD-10-CM

## 2024-02-22 DIAGNOSIS — I10 ESSENTIAL HYPERTENSION: Chronic | ICD-10-CM

## 2024-02-22 DIAGNOSIS — E78.00 HYPERCHOLESTEREMIA: Chronic | ICD-10-CM

## 2024-02-22 DIAGNOSIS — E55.9 VITAMIN D DEFICIENCY: ICD-10-CM

## 2024-02-22 DIAGNOSIS — R79.89 ABNORMAL THYROID BLOOD TEST: ICD-10-CM

## 2024-02-22 DIAGNOSIS — E11.21 DIABETIC NEPHROPATHY ASSOCIATED WITH TYPE 2 DIABETES MELLITUS (HCC): Chronic | ICD-10-CM

## 2024-02-22 PROCEDURE — 99396 PREV VISIT EST AGE 40-64: CPT | Performed by: INTERNAL MEDICINE

## 2024-02-22 RX ORDER — CHOLECALCIFEROL (VITAMIN D3) 50 MCG
2000 TABLET ORAL DAILY
Qty: 90 TABLET | Refills: 3 | Status: SHIPPED | OUTPATIENT
Start: 2024-02-22 | End: 2025-02-21

## 2024-02-22 NOTE — PATIENT INSTRUCTIONS
Reinforced diet exercise target to bring the hemoglobin A1c down to less than 6.5.  Repeat thyroid test in 4 months.

## 2024-02-22 NOTE — PROGRESS NOTES
Evangelina Hahn  1970 is a 53 year old male.  Chief Complaint   Patient presents with    Physical       HPI:   complete physical  Current Outpatient Medications   Medication Sig Dispense Refill    Cholecalciferol (VITAMIN D) 50 MCG (2000 UT) Oral Tab Take 2,000 Units by mouth daily. 90 tablet 3    Continuous Blood Gluc Sensor (FREESTYLE XIMENA 3 SENSOR) Does not apply Misc 1 Device every 14 (fourteen) days. 2 each 3    Dulaglutide (TRULICITY) 0.75 MG/0.5ML Subcutaneous Solution Pen-injector Inject 0.75 mg into the skin once a week. 2 mL 3    empagliflozin (JARDIANCE) 10 MG Oral Tab Take 1 tablet (10 mg total) by mouth daily. 90 tablet 1    valsartan 160 MG Oral Tab Take 1 tablet (160 mg total) by mouth daily. 90 tablet 3    atorvastatin 20 MG Oral Tab Take 1 tablet (20 mg total) by mouth daily. 90 tablet 2    glipiZIDE 5 MG Oral Tab TAKE 1 TABLET DAILY 90 tablet 0    FLUTICASONE PROPIONATE 50 MCG/ACT Nasal Suspension SHAKE LIQUID AND USE 2 SPRAYS IN EACH NOSTRIL DAILY 16 g 0    metFORMIN HCl 1000 MG Oral Tab Take 1 tablet (1,000 mg total) by mouth 2 (two) times daily with meals. 180 tablet 1    aspirin (ASPIRIN LOW DOSE) 81 MG Oral Tab EC Take 1 tablet (81 mg total) by mouth daily. 90 tablet 1    dorzolamide 2 % Ophthalmic Solution Place 1 drop into both eyes in the morning and 1 drop before bedtime.      Glucose Blood (ACCU-CHEK GEORGETTE PLUS) In Vitro Strip TEST EVERY  strip 1    latanoprost 0.005 % Ophthalmic Solution Place 1 drop into both eyes nightly.        Past Medical History:   Diagnosis Date    Chronic right shoulder pain 2018    Diabetes (HCC)     Esophageal reflux     Hyperlipidemia       Social History:  Social History     Socioeconomic History    Marital status: Single   Tobacco Use    Smoking status: Never    Smokeless tobacco: Never   Vaping Use    Vaping Use: Never used   Substance and Sexual Activity    Alcohol use: No     Alcohol/week: 0.0 standard drinks of alcohol    Drug use: No    Other Topics Concern    Caffeine Concern Yes     Comment: 2 cups of tea daily    Exercise No        REVIEW OF SYSTEMS:     General/Constitutional:   General able to do usual activities, good exercise tolerance, good general state of health, no fatigue, no fever, no weakness .   HEENT/Neck:   Head no dizziness, no lightheadedness. Eyes does have glaucoma.  Does see Dr. omalley no redness , no drainage. Ears no earaches, no fullness, normal hearing, no tinnitus. Nose and Sinuses no recurrent colds, no stuffiness, no discharge, no hay fever, no nosebleeds, no sinus trouble. Mouth and Pharynx no sore throats, no hoarseness. Neck no lumps, no goiter, no neck stiffness or pain.   Endocrine:   Diabetes going to the diabetic clinic on a as needed basis_ thyroid disorder none.   Respiratory:   Patient denies chest pain, cough, PATEL (dyspnea on exertion),wheezing. Breathing normal pattern . Chest congestion none.   Cardiovascular:   Patient denies chest pain, rheumatic fever,heart murmur.no hx of elevated / Leg edema none. Orthopnea no. Palpitations none. PND (paroxsymal nocturnal dyspnea) none.  Does have a history of high cholesterol /htn on meds   Gastrointestinal:   Patient denies abdominal pain,  blood in stool, vomiting, nausea, denies any wt loss or gain no change in appetite noted no change in bowel movement noted. Dysphagia none.  Does have a history of occ  heartburn and bloating never went for colon  Hematology:   Patient denies abnormal bleeding, easy bruising. Enlarged lymph nodes none.   Men Only:   Patient denies difficulty with erection, penile discharge, testicular pain or swelling, urgency/frequency.   Genitourinary:   Patient denies blood in urine, burning on urination, difficulty urinating, dysuria, urinary frequency , urinary incontinence/no history of kidney disease or genital abnormalities. no Dysuria. Nocturia None.   Musculoskeletal:   Patient denies arthritis , muscle weakness . Joint pain  occasional right knee. Joint stiffness none.  Occasionally notes that his left ring finger locks up  peripheral Vascular:   General no varicosities, no claudication.   Dermatologic:   Rash none.  cc of dystrophic right great toenail  Neurologic:   Patient denies dizziness, fainting, headache, loss of consciousness, memory loss, seizures, paresthesias, weakness. Tingling/numbness none. Trouble with balance none.   Psychiatric:   Patient denies anxiety, depression, hallucinations. Insomnia none/ hx of sleep disorder known to the patient however does have episodes of snoring-see sleep questionnaire. Memory loss none.   EXAM:   /86 (BP Location: Left arm, Patient Position: Sitting, Cuff Size: adult)   Pulse 106   Temp 98.1 °F (36.7 °C) (Temporal)   Resp 14   Ht 5' 9\" (1.753 m)   Wt 229 lb (103.9 kg)   SpO2 98%   BMI 33.82 kg/m²   GENERAL:   Build: average .   HEENT:   Ear canals: normal.   Hearing assessed yes  EOM: within normal limits.Pupils BEERTL.Sclera and Conjunctiva normal.      Head: normocephalic.   Nasal septum: midline.   Nose: normal pink mucosa, no congestion, no swelling, no bleeding.   Oral cavity: normal, no lesions seen.  Narrow oropharynx   Turbinates: normal.   NECK:   Carotid bruit: none.   Cervical lymph nodes: unremarkable.   JVD: none.   Range of Motion: normal.   Thyroid: unremarkable.   HEART:   Clicks: no.   Edema: none visible .   Heart sounds: normal.   Murmurs: none.   Rhythm: regular.   LUNGS:   Auscultation: clear .   Chest Shape: normal .   Percussion: normal.   Rales: no .   Respiratory effort: normal .   Rhonchi: no.   Wheezes: no.   ABDOMEN:   Bowel sounds: normal.   General: Obese   Hernia: absent.   Liver, Spleen: no hepatosplenomegaly (HSM).   Tenderness: absent .   GENITOURINARY - MALE:   External genitals: unremarkable.   APOORVA: normal .   Penis: unremarkable.   Prostate: unremarkable.   Testicles: unremarkable.   EXTREMITIES:   Clubbing: none.   Cyanosis: absent .    Edema: none.   Pulses: present.   Tremors: no.   Varicose veins: absent .   MUSCULOSKELETAL:   Cervical spines: normal.   L-S spines: normal.   Lower extremity joints: normal.   Upper extremity joints: normal.  Left ring finger suggestive of trigger finger  NEUROLOGICAL:   Cognitive function  Mood /affect -thought :perception :normal  Appearance-orientation normal  Thought normal   Babinski: negative..   Cerebellar Testing grossly/intact: yes..   Cranial Nerves: CN's II-XII grossly intact.   Gait: normal.   Motor: Power,tone,co-ordination normalInvoluntary movements and wasting none.   Reflexes: normal.   Sensory: all sensory modalities normal.   LYMPHATICS:   Cervical: none.   Groin: no adenopathy .   Inguinal: no adenopathy.   Supraclavicular: none.   DERMATOLOGY:   Rash: no.  Dystrophic toenails noted      ASSESSMENT AND PLAN:   Evangelina was seen today for physical.    Diagnoses and all orders for this visit:    Routine general medical examination at a health care facility  -     Gastro Referral - In Network    Vitamin D deficiency    Essential hypertension    Hypercholesteremia    Diabetic nephropathy associated with type 2 diabetes mellitus (HCC)    Abnormal thyroid blood test  -     TSH and Free T4 [E]; Future    Other orders  -     Cholecalciferol (VITAMIN D) 50 MCG (2000 UT) Oral Tab; Take 2,000 Units by mouth daily.    Labs discussed with patient     There are no Patient Instructions on file for this visit.    The patient indicates understanding of these issues and agrees to the plan.  The patient is asked to No follow-ups on file.      Tico Tapia MD

## 2024-02-24 DIAGNOSIS — I10 ESSENTIAL HYPERTENSION: Chronic | ICD-10-CM

## 2024-02-26 RX ORDER — AMLODIPINE BESYLATE 5 MG/1
5 TABLET ORAL DAILY
Qty: 90 TABLET | Refills: 1 | Status: SHIPPED | OUTPATIENT
Start: 2024-02-26

## 2024-02-26 NOTE — TELEPHONE ENCOUNTER
LOV: 2/22/24   RTC: 6 months   Filled: 11/16/23 # 30 2 refills   Labs: 1/11/24   Future Appointments   Date Time Provider Department Center   7/10/2024  3:15 PM Romana Barrett APRN [facility] [place]

## 2024-04-22 ENCOUNTER — OFFICE VISIT (OUTPATIENT)
Dept: INTERNAL MEDICINE CLINIC | Facility: CLINIC | Age: 54
End: 2024-04-22
Payer: MEDICAID

## 2024-04-22 VITALS
HEART RATE: 110 BPM | TEMPERATURE: 98 F | WEIGHT: 226 LBS | SYSTOLIC BLOOD PRESSURE: 126 MMHG | DIASTOLIC BLOOD PRESSURE: 82 MMHG | BODY MASS INDEX: 33.47 KG/M2 | RESPIRATION RATE: 16 BRPM | OXYGEN SATURATION: 95 % | HEIGHT: 69 IN

## 2024-04-22 DIAGNOSIS — R05.1 ACUTE COUGH: Primary | ICD-10-CM

## 2024-04-22 PROCEDURE — 99213 OFFICE O/P EST LOW 20 MIN: CPT | Performed by: INTERNAL MEDICINE

## 2024-04-22 RX ORDER — AMOXICILLIN AND CLAVULANATE POTASSIUM 500; 125 MG/1; MG/1
1 TABLET, FILM COATED ORAL 2 TIMES DAILY
Qty: 20 TABLET | Refills: 0 | Status: SHIPPED | OUTPATIENT
Start: 2024-04-22 | End: 2024-05-02

## 2024-04-22 RX ORDER — CODEINE PHOSPHATE AND GUAIFENESIN 10; 100 MG/5ML; MG/5ML
5 SOLUTION ORAL EVERY 6 HOURS PRN
Qty: 200 ML | Refills: 0 | Status: SHIPPED | OUTPATIENT
Start: 2024-04-22

## 2024-04-22 NOTE — PROGRESS NOTES
Evangelina Hahn  1970 is a 53 year old male who presents for upper respiratory symptoms    Chief Complaint   Patient presents with    Flu         HPI:   Pt reports  respiratory symptoms for 2 days.  Sore throat.  COVID test negative.  Difficulty in swallowing.     Current Outpatient Medications   Medication Sig Dispense Refill    amoxicillin clavulanate 500-125 MG Oral Tab Take 1 tablet by mouth 2 (two) times daily for 10 days. 20 tablet 0    guaiFENesin-codeine (CHERATUSSIN AC) 100-10 MG/5ML Oral Solution Take 5 mL by mouth every 6 (six) hours as needed. 200 mL 0    amLODIPine 5 MG Oral Tab TAKE 1 TABLET(5 MG) BY MOUTH DAILY 90 tablet 1    Cholecalciferol (VITAMIN D) 50 MCG (2000 UT) Oral Tab Take 2,000 Units by mouth daily. 90 tablet 3    Continuous Blood Gluc Sensor ([employer] RepublicSTYLE XIMENA 3 SENSOR) Does not apply Misc 1 Device every 14 (fourteen) days. 2 each 3    Dulaglutide (TRULICITY) 0.75 MG/0.5ML Subcutaneous Solution Pen-injector Inject 0.75 mg into the skin once a week. 2 mL 3    empagliflozin (JARDIANCE) 10 MG Oral Tab Take 1 tablet (10 mg total) by mouth daily. 90 tablet 1    valsartan 160 MG Oral Tab Take 1 tablet (160 mg total) by mouth daily. 90 tablet 3    atorvastatin 20 MG Oral Tab Take 1 tablet (20 mg total) by mouth daily. 90 tablet 2    glipiZIDE 5 MG Oral Tab TAKE 1 TABLET DAILY 90 tablet 0    FLUTICASONE PROPIONATE 50 MCG/ACT Nasal Suspension SHAKE LIQUID AND USE 2 SPRAYS IN EACH NOSTRIL DAILY 16 g 0    metFORMIN HCl 1000 MG Oral Tab Take 1 tablet (1,000 mg total) by mouth 2 (two) times daily with meals. 180 tablet 1    aspirin (ASPIRIN LOW DOSE) 81 MG Oral Tab EC Take 1 tablet (81 mg total) by mouth daily. 90 tablet 1    dorzolamide 2 % Ophthalmic Solution Place 1 drop into both eyes in the morning and 1 drop before bedtime.      Glucose Blood (ACCU-CHEK GEORGETTE PLUS) In Vitro Strip TEST EVERY  strip 1    latanoprost 0.005 % Ophthalmic Solution Place 1 drop into both eyes nightly.         Past Medical History:    Chronic right shoulder pain    Diabetes (HCC)    Esophageal reflux    Hyperlipidemia      Social History     Socioeconomic History    Marital status: Single   Tobacco Use    Smoking status: Never    Smokeless tobacco: Never   Vaping Use    Vaping status: Never Used   Substance and Sexual Activity    Alcohol use: No     Alcohol/week: 0.0 standard drinks of alcohol    Drug use: No   Other Topics Concern    Caffeine Concern Yes     Comment: 2 cups of tea daily    Exercise No         REVIEW OF SYSTEMS:   GENERAL: feels well otherwise.Denies fever  SKIN: no rashes  EYES:denies eye pain,discharge   HEENT:see above   LUNGS: denies shortness of breath,cough,expectoration,chest pain,wheezing  CARDIOVASCULAR: denies chest pain on exertion  GI: no nausea or abdominal pain  NEURO: denies headaches    EXAM:   /82 (BP Location: Right arm, Patient Position: Sitting, Cuff Size: adult)   Pulse 110   Temp 98.1 °F (36.7 °C) (Temporal)   Resp 16   Ht 5' 9\" (1.753 m)   Wt 226 lb (102.5 kg)   SpO2 95%   BMI 33.37 kg/m²   GENERAL: well developed, well nourished,in no apparent distress  SKIN: no rashes,no suspicious lesions  EYES:PERRLA, EOMI intact,conjunctiva are clear  ENT: ears neg throat injected  NECK: supple, neg adenopathy,no bruits  LUNGS: clear to auscultation.air entry equal.no chest wall tenderness. wheeze neg  CARDIO: RRR without murmur  GI: good BS's,no masses, HSM or tenderness    ASSESSMENT AND PLAN:   Evangelina was seen today for flu.    Diagnoses and all orders for this visit:    Acute cough  -     amoxicillin clavulanate 500-125 MG Oral Tab; Take 1 tablet by mouth 2 (two) times daily for 10 days.  -     guaiFENesin-codeine (CHERATUSSIN AC) 100-10 MG/5ML Oral Solution; Take 5 mL by mouth every 6 (six) hours as needed.        Patient Instructions   Mouthwash    The patient indicates understanding of these issues and agrees to the plan.  The patient is asked to No follow-ups on  file..      Tico Tapia MD

## 2024-05-23 ENCOUNTER — MED REC SCAN ONLY (OUTPATIENT)
Dept: INTERNAL MEDICINE CLINIC | Facility: CLINIC | Age: 54
End: 2024-05-23

## 2024-06-03 NOTE — TELEPHONE ENCOUNTER
Requested Prescriptions     Pending Prescriptions Disp Refills    glipiZIDE 5 MG Oral Tab [Pharmacy Med Name: GLIPIZIDE 5MG TABLETS] 90 tablet 0     Sig: TAKE 1 TABLET BY MOUTH DAILY     Future Appointments   Date Time Provider Department Center   7/10/2024  3:15 PM Romana Barrett APRN [facility] [place]     Last A1c value was 7.1% done 2/14/2024.  Refill 11/20/23  LOV 2/14/24

## 2024-06-04 RX ORDER — GLIPIZIDE 5 MG/1
TABLET ORAL
Qty: 90 TABLET | Refills: 0 | Status: SHIPPED | OUTPATIENT
Start: 2024-06-04

## 2024-06-13 ENCOUNTER — TELEPHONE (OUTPATIENT)
Dept: ENDOCRINOLOGY CLINIC | Facility: CLINIC | Age: 54
End: 2024-06-13

## 2024-06-13 NOTE — TELEPHONE ENCOUNTER
Fax received from [place] that patient's freestyle vicki 3 needs PRIOR AUTHORIZATION.    Per last OFFICE VISIT, patient will be paying out of pocket.

## 2024-06-20 DIAGNOSIS — E11.65 TYPE 2 DIABETES MELLITUS WITH HYPERGLYCEMIA, WITHOUT LONG-TERM CURRENT USE OF INSULIN (HCC): ICD-10-CM

## 2024-06-21 RX ORDER — ASPIRIN 81 MG/1
81 TABLET, COATED ORAL DAILY
Qty: 90 TABLET | Refills: 1 | Status: SHIPPED | OUTPATIENT
Start: 2024-06-21

## 2024-06-21 NOTE — TELEPHONE ENCOUNTER
Requested Prescriptions     Pending Prescriptions Disp Refills    METFORMIN HCL 1000 MG Oral Tab [Pharmacy Med Name: METFORMIN 1000MG TABLETS] 180 tablet 1     Sig: TAKE 1 TABLET(1000 MG) BY MOUTH TWICE DAILY WITH MEALS     Your appointments       Date & Time Appointment Department ([place])    Jul 10, 2024 3:15 PM CDT Diabetes [facility] follow up with Romana Barrett APRN [facility] ([facility])              [facility]  [address]  [place] [id number]  [phone]          Last A1c value was 7.1% done 2/14/2024.    Refill 10/11/23  LOV 2/14/24

## 2024-07-10 ENCOUNTER — OFFICE VISIT (OUTPATIENT)
Dept: ENDOCRINOLOGY CLINIC | Facility: CLINIC | Age: 54
End: 2024-07-10
Payer: MEDICAID

## 2024-07-10 VITALS
SYSTOLIC BLOOD PRESSURE: 140 MMHG | WEIGHT: 230.38 LBS | BODY MASS INDEX: 34 KG/M2 | OXYGEN SATURATION: 97 % | HEART RATE: 108 BPM | RESPIRATION RATE: 16 BRPM | DIASTOLIC BLOOD PRESSURE: 80 MMHG

## 2024-07-10 DIAGNOSIS — E78.5 DYSLIPIDEMIA: ICD-10-CM

## 2024-07-10 DIAGNOSIS — E11.21 DIABETIC NEPHROPATHY ASSOCIATED WITH TYPE 2 DIABETES MELLITUS (HCC): ICD-10-CM

## 2024-07-10 DIAGNOSIS — E66.9 OBESITY (BMI 30-39.9): ICD-10-CM

## 2024-07-10 DIAGNOSIS — E11.65 TYPE 2 DIABETES MELLITUS WITH HYPERGLYCEMIA, WITHOUT LONG-TERM CURRENT USE OF INSULIN (HCC): Primary | ICD-10-CM

## 2024-07-10 DIAGNOSIS — I10 ESSENTIAL HYPERTENSION: ICD-10-CM

## 2024-07-10 LAB — HEMOGLOBIN A1C: 7.4 % (ref 4.3–5.6)

## 2024-07-10 PROCEDURE — 83036 HEMOGLOBIN GLYCOSYLATED A1C: CPT | Performed by: NURSE PRACTITIONER

## 2024-07-10 PROCEDURE — 95251 CONT GLUC MNTR ANALYSIS I&R: CPT | Performed by: NURSE PRACTITIONER

## 2024-07-10 PROCEDURE — 99214 OFFICE O/P EST MOD 30 MIN: CPT | Performed by: NURSE PRACTITIONER

## 2024-07-10 RX ORDER — KETOROLAC TROMETHAMINE 4 MG/ML
SOLUTION/ DROPS OPHTHALMIC
COMMUNITY
Start: 2024-06-22

## 2024-07-10 RX ORDER — PREDNISOLONE ACETATE 10 MG/ML
SUSPENSION/ DROPS OPHTHALMIC
COMMUNITY
Start: 2024-06-21

## 2024-07-10 NOTE — PROGRESS NOTES
Chief Complaint   Patient presents with    Diabetes     Follow up streaming Dk        Evangelina Hahn is a 53 year old presents today for diabetes management.   Primary care physician: Tico Tapia MD   Last appt with Diabetes center:      Had B cataract surgery last month. Surgery went well but did need some steroid eye gtts          Today 's A1C 7.4  %  ( last A1C 7.1 %)  Admits missing PM glipizide and Metformin ~ 3 d/week     DK CGM   Reviewed CGM report/trends w patient today- see above   Sensor active time: 50%   30 day GMI 7.8%   Hypoglycemia 0%  TIR 47% (ADA recommended goal > 70%)     Over 200 mg/dl from 10pm - 4 am; eating late dinner and missing glip/metfomrin         Diabetes History:  Type 2 DM ~    Patient has not had hospitalizations for blood sugar issues and denies any history of pancreatitis    Previous DM therapies:  januvia : lack of effect, change in therapy    Current DM Regimen:  Glipizide 5m tab twice  daily (skips PM dose ~3 d/week)   Jardiance 10mg once daily   Metformin 1000mg twice daily ( skipds PM dose ~ 3 d/week)   Trulicity 0.75mg subcutaneous once weekly       HGBA1C:    Lab Results   Component Value Date    A1C 7.1 (A) 2024    A1C 8.5 (H) 10/09/2023    A1C 7.7 (A) 2022     (H) 10/09/2023       Lab Results   Component Value Date    CHOLEST 146 2024    CHOLEST 136 2023    TRIG 77 2024    TRIG 85 2023    HDL 49 2024    HDL 44 2023    LDL 82 2024    LDL 76 2023     Lab Results   Component Value Date    MICROALBCREA 287.1 (H) 10/09/2023    MICROALBCREA 350.6 (H) 10/05/2022      Lab Results   Component Value Date    CREATSERUM 1.13 10/09/2023    CREATSERUM 0.92 2023    EGFRCR 77 10/09/2023    EGFRCR 99 2023     Lab Results   Component Value Date    AST 14 (L) 10/09/2023    AST 19 2023    ALT 34 10/09/2023    ALT 32 2023       Lab Results   Component Value Date    TSH 4.380  (H) 01/11/2024    TSH 2.020 09/30/2021    T4F 0.9 01/10/2020           DM Complications:  Microvascular:   Neuropathy: no  Retinopathy: yes  Nephropathy: yes    Macrovascular:  PVD: no  CAD: no  Stroke/CVA: no    Modifying factors:  Medication adherence  missing glip/metformin PM dose ~ 3 d/week   Recent steroids, illness or infections: (past 90 d)  no     Allergies: Seasonal    Past Medical History:    Chronic right shoulder pain    Diabetes (HCC)    Esophageal reflux    Hyperlipidemia     Past Surgical History:   Procedure Laterality Date    Cataract Bilateral      Social History     Socioeconomic History    Marital status: Single   Tobacco Use    Smoking status: Never    Smokeless tobacco: Never   Vaping Use    Vaping status: Never Used   Substance and Sexual Activity    Alcohol use: No     Alcohol/week: 0.0 standard drinks of alcohol    Drug use: No   Other Topics Concern    Caffeine Concern Yes     Comment: 2 cups of tea daily    Exercise No     Family History   Problem Relation Age of Onset    Diabetes Father     Obesity Father     Diabetes Mother     Obesity Mother      Current Medication List:   Current Outpatient Medications   Medication Sig Dispense Refill    prednisoLONE 1 % Ophthalmic Suspension       Ketorolac Tromethamine 0.4 % Ophthalmic Solution       ASPIRIN LOW DOSE 81 MG Oral Tab EC TAKE 1 TABLET(81 MG) BY MOUTH DAILY 90 tablet 1    METFORMIN HCL 1000 MG Oral Tab TAKE 1 TABLET(1000 MG) BY MOUTH TWICE DAILY WITH MEALS 180 tablet 1    glipiZIDE 5 MG Oral Tab TAKE 1 TABLET BY MOUTH DAILY 90 tablet 0    amLODIPine 5 MG Oral Tab TAKE 1 TABLET(5 MG) BY MOUTH DAILY 90 tablet 1    Cholecalciferol (VITAMIN D) 50 MCG (2000 UT) Oral Tab Take 2,000 Units by mouth daily. 90 tablet 3    Dulaglutide (TRULICITY) 0.75 MG/0.5ML Subcutaneous Solution Pen-injector Inject 0.75 mg into the skin once a week. 2 mL 3    empagliflozin (JARDIANCE) 10 MG Oral Tab Take 1 tablet (10 mg total) by mouth daily. 90 tablet 1     valsartan 160 MG Oral Tab Take 1 tablet (160 mg total) by mouth daily. 90 tablet 3    atorvastatin 20 MG Oral Tab Take 1 tablet (20 mg total) by mouth daily. 90 tablet 2    guaiFENesin-codeine (CHERATUSSIN AC) 100-10 MG/5ML Oral Solution Take 5 mL by mouth every 6 (six) hours as needed. 200 mL 0    Continuous Blood Gluc Sensor (FREESTYLE XIMENA 3 SENSOR) Does not apply Misc 1 Device every 14 (fourteen) days. 2 each 3    FLUTICASONE PROPIONATE 50 MCG/ACT Nasal Suspension SHAKE LIQUID AND USE 2 SPRAYS IN EACH NOSTRIL DAILY 16 g 0    dorzolamide 2 % Ophthalmic Solution Place 1 drop into both eyes in the morning and 1 drop before bedtime.      Glucose Blood (ACCU-CHEK GEORGETTE PLUS) In Vitro Strip TEST EVERY  strip 1    latanoprost 0.005 % Ophthalmic Solution Place 1 drop into both eyes nightly.         DM associated review of  symptoms:   Endocrine: Polyuria, polyphagia, polydipsia: no  Neurological: Paresthesias: no  HEENT: Blurred vision: no  Skin: No rash . No wounds  Hematological: Hypoglycemia: no     Review of Systems     LUNGS: denies shortness of breath   CARDIOVASCULAR: denies chest pain  GI: denies abdominal pain, nausea or diarrhea   : denies dysuria  MUSCULOSKELETAL: denies pain        Physical exam:  /80   Pulse 108   Resp 16   Wt 230 lb 6.4 oz (104.5 kg)   SpO2 97%   BMI 34.02 kg/m²   Body mass index is 34.02 kg/m².    Physical Exam     Constitutional: Normal appearance   Cardiac:  Normal rate  Pulmonary/Chest: Effort normal  Neurological: Alert and oriented .   Psychiatric: Normal mood and affect.       Assessment/Plan:  Nephropathy associated with T2 DM  Update level - declined urine collection today   Reviewed prior labs and importance of kidney health screening annually   Continue  SGLT 2 rx , arb rx       Hypertension   140-80 today   Advised BP < 140-80   CPM       Dyslipidemia  Last labs 1-2024-   Continue Atorvastatin       Obesity     Weight 230 lb ( last weight; :230 lb )    Continue lifestyle modications   Declined increase in GLP1 rx dose       Type  diabetes mellitus with hyperglycemia (HCC)  A1C: 7.4% (last A1C 7.1%)   14 d GMI on dk 7.8%   Weight 230 lb ( last weight : 2309 lb)  Diabetes control is increased. Needs ongoing management and evaluation  given the chronicity of Diabetes, ongoing medication monitoring and risk for complications   dk 3 cgm (( paying cash )   Discussed increasing Trulicity 0.75mg but declined dose increase  Discussed changing Metformin and Glipizide to XL for AM dosing but declined  Prefers \"another 3m\"   For now, continue   Metformin 1000mg twice daily   jardiance 10mg once daily    Glipizide  5mg twice daily     Trulicity 0.75mg subcutaneous once weekly       Reminded of hydration importance w SGLT2i rx , sick day management     Reviewed  clinical signifiance of A1c, adverse effects of suboptimal glucose control, and goals of therapy discussed with pt in detail  Continue lifestyle modifications: diet, carb controlling, physical activity since positive impact on BG trends/health -    Reminded pt on A1C and blood sugar targets (Fasting < 130 and post prandial <180 ) and complications associated with hyperglycemia and uncontrolled DM  Reviewed hypoglycemia signs/symptoms, treatment using the Rule of 15' and when to call DM center .  Dk review in 4 weeks     Patient is asked to return in 3m . Also , recommended to contact DM clinic sooner if questions or concerns.    The patient indicates understanding of these issues and agrees to the plan.      Orders Placed This Encounter    HEMOGLOBIN A1C     Order Specific Question:   Release to patient     Answer:   Immediate    prednisoLONE 1 % Ophthalmic Suspension    Ketorolac Tromethamine 0.4 % Ophthalmic Solution       DM quality  A1C/Blood pressure: as reported above   Nephropathy screening: labs  ++ urine m/alb   continue ace /arb rx.   LIPID screenin    . atorvastatin rx.   Last  dilated eye exam: Last Dilated Eye Exam: 04/20/24   Exam shows retinopathy? Eye Exam shows Diabetic Retinopathy?: Yes  Last diabetic foot exam: Last Foot Exam: 11/16/23      The risks and benefits of my recommendations, as well as other treatment options were discussed with the patient today. questions were also answered to the best of my knowledge.

## 2024-07-10 NOTE — PATIENT INSTRUCTIONS
Your A1C: 7.4 % ( up from 7.1%)   Be sure to have the urine test done for kidney health   The diabetes control is higher for you and I am happy to  work together with you with improving your health.   The A1C:  The A1C test provides us with your average blood sugar for the past 3 months. Keeping an A1C less than 7% helps reduce or delay health problems that are related to diabetes.   The main goal of diabetes treatment is to keep your sugar from going too high to prevent or delay complications from the disease as well as maintain your quality of life.   The target for A1C is less than 7.0%  but sometimes we make exceptions based on age, health history and other factors.     It is important to take all of your medications as prescribed. Please call me if you cannot get the prescriptions filled or are having issues with refills.   Please call me if you have any issues with medication questions, side effects, dosing questions or problems with your blood sugar trends BEFORE CHANGING OR STOPPING ANY MEDICATIONS.   MEDICATIONS:   Changes today please try to remember to dose glipizide and Metformin TWICE daily    Continue:   Glipizide 5m tab twice  daily  Jardiance 10mg once daily   Metformin 1000mg twice daily   Trulicity 0.75mg subcutaneous once weekly     Blood sugar targets:  Before breakfast:   (preferably less than 110)  2 hours After meals: less than 180 (preferably less than 150)   Please call me if you are having blood sugars less than 75 or more than 200 more than 2 days in a week time span.     Hypoglycemia:    Watch for low blood sugars: (less than 70)  Symptoms of low blood sugar:   Shakiness or dizziness  Cold, clammy skin or sweating  Feeling hungry  Headache  Nervousness  A hard, fast heartbeat  Weakness  Confusion or irritability  Blurred eyesight  Having nightmares or waking up confused or sweating  Numbness or tingling in the lips or tongue     Treatment of Low Blood sugar Action Plan  1. Check  blood glucose to be sure that it is low. You can’t always go by symptoms. If in doubt, treat your low blood glucose anyway.  2. Take 15 grams of carbohydrate (carb). Here are some choices:  4 oz. regular fruit juice  3-4 glucose tablets  6 oz. regular soda   7-8 jelly beans  3. Recheck blood glucose after 10-15 minutes. If blood glucose is still low (less than 70 mg/dl) repeat the treatment (step 2).  4. If your next meal is more than one hour away, eat a small snack.  5. If you’re not sure what caused your low blood glucose, call us .  6. Always check your blood glucose before you drive         To treat a low, I recommend you carry with you easy, pre-portioned treatment for low blood sugars that are 15G of carbs:   - Children sized squeeze pouch applesauce (high fiber + carbs help prevent too high of a spike)  - Small children's sized juicebox- 15g carb --> 4oz juice box  - Glucose tablets from [employer], you can find them near diabetes supplies --> Note, you will need to eat 3-4 tablets to get to 15g of carbs  - Children sized fruit snack pack- look for one with 15 grams of total carbohydrate    AVOID complex carbs, or foods that contain fats along with carbs (like chocolate) can slow the absorption of glucose and should NOT be used to treat an emergency low    Diabetes health monitoring      1. LABS: It is important to monitor your kidney function (blood and urine protein levels) , liver function tests and cholesterol levels when you have diabetes. If your primary care provider has not ordered these tests, I will order them for you.   2. FOOT exams:  daily foot inspections for foot wounds or skin changes are important for foot care. Any unusual changes should be reported immediately.  3. EYE exams: Checking your eyes for diabetes changes is important and you should have a dilated eye exam done by an eye doctor EVERY year since these changes occur in the BACK of the eye and not visible by you.     Office  protocols:   My Chart Messages - Our goal is to respond to all My Chart messages within 2 business days of receipt. Messages received after 4 pm on Friday, will be addressed on Monday, except if holiday.   Please DO NOT send urgent messages through My Chart as these messages are not monitored after hours.       Patient Calls -We make every attempt to answer all patient calls within 1 business day. Calls that come in after 4pm daily will be addressed the following business day. Nurses are available to answer your calls Monday - Friday from 8am - 4 pm except for holidays.      Refill policies:     Refills may be delayed if you have not had a recent office visit or recent labs as requested by your prescriber.       Our goal is to process your refill within 3  business day of receiving the refill request.   Contact your pharmacy at least 5 days prior to running out of medication and have them send an electronic request or submit a refill through My chart: select  “request refill” option in your [employer] account.  Refills are not addressed after hours or on weekends; covering providers  do not authorize routine medications on weekends.  Refills  received after 4pm on Fridays will be addressed on Mondays.    If  your prescription is due for a refill, and you are due for a follow up appointment., this will  may impact the ability for you to get a 90 supply if requested.   Yearly blood tests are  required for many medications to insure safe prescribing. If you are due for labs, you will have 30 days to complete the  requested labs before future refills are authorized.   In the event that your preferred pharmacy does not have the requested medication in stock (e.g. Backordered), it is your responsibility to find another pharmacy that has the requested medication available.  We will gladly send a new prescription to that pharmacy at your request.  To best provide you care, patients receiving routine medications need to be seen  at least twice per year. However if the A1C is above 8% you will be need to be seen more frequently as recommended by provider and this will also impact your ability get obtain refills          Thank you   Romana Barrett   [phone]

## 2024-07-10 NOTE — PROGRESS NOTES
Name: Evangelina Hahn  YOB: 1970  Report Period: 06/12/2024 - 07/09/2024 (28 days)  Generated: 07/10/2024  % Time CGM Active: 50%      Glucose Statistics and Targets  Average Glucose: 187 mg/dL  Glucose Management Indicator (GMI): 7.8%  Glucose Variability (%CV): 23.0%  Target Range: 70 - 180 mg/dL      Time in Ranges  Very High: >250 mg/dL --- 8%  High: 181 - 250 mg/dL --- 45%  Target Range: 70 - 180 mg/dL --- 47%  Low: 54 - 69 mg/dL --- 0%  Very Low: <54 mg/dL --- 0%

## 2024-07-11 RX ORDER — ASPIRIN 81 MG/1
81 TABLET, COATED ORAL DAILY
Qty: 90 TABLET | Refills: 1 | OUTPATIENT
Start: 2024-07-11

## 2024-07-16 DIAGNOSIS — E11.65 TYPE 2 DIABETES MELLITUS WITH HYPERGLYCEMIA, WITHOUT LONG-TERM CURRENT USE OF INSULIN (HCC): ICD-10-CM

## 2024-07-17 RX ORDER — BLOOD-GLUCOSE SENSOR
1 EACH MISCELLANEOUS
Qty: 2 EACH | Refills: 3 | Status: SHIPPED | OUTPATIENT
Start: 2024-07-17

## 2024-07-17 NOTE — TELEPHONE ENCOUNTER
Requested Prescriptions     Pending Prescriptions Disp Refills    FREESTYLE XIMENA 3 SENSOR Does not apply Misc [Pharmacy Med Name: FREESTYLE XIMENA 3 SENSOR] 2 each 3     Sig: USE AS DIRECTED AND CHANGE EVERY 14 DAYS     Your appointments       Date & Time Appointment Department ([place])    Nov 12, 2024 4:00 PM CST Diabetes Pump follow up with Romana Barrett APRN [facility] ([facility])              [facility]  [address]  [place] [facility] [address]  [phone]          Last A1c value was 7.4% done 7/10/2024.    Refill 2/14/24  LOV 7/10/24

## 2024-07-29 DIAGNOSIS — E11.65 TYPE 2 DIABETES MELLITUS WITH HYPERGLYCEMIA, WITHOUT LONG-TERM CURRENT USE OF INSULIN (HCC): ICD-10-CM

## 2024-07-29 RX ORDER — DULAGLUTIDE 0.75 MG/.5ML
0.75 INJECTION, SOLUTION SUBCUTANEOUS
Qty: 2 ML | Refills: 3 | Status: SHIPPED | OUTPATIENT
Start: 2024-07-29

## 2024-07-29 NOTE — TELEPHONE ENCOUNTER
Requested Prescriptions     Pending Prescriptions Disp Refills    TRULICITY 0.75 MG/0.5ML Subcutaneous Solution Pen-injector [Pharmacy Med Name: TRULICITY 0.75MG/0.5ML INJ (4 PENS)] 2 mL 3     Sig: ADMINISTER 0.75 MG UNDER THE SKIN 1 TIME A WEEK     Your appointments       Date & Time Appointment Department ([place])    Nov 12, 2024 4:00 PM CST Diabetes Pump follow up with Romana Barrett APRN [facility] ([facility])              [facility]  [address]  [place] [address] [phone]          Last A1c value was 7.4% done 7/10/2024.    Refill 2/14/24  LOV 7/10/24

## 2024-08-12 RX ORDER — GLIPIZIDE 5 MG/1
TABLET ORAL
Qty: 90 TABLET | Refills: 0 | Status: SHIPPED | OUTPATIENT
Start: 2024-08-12

## 2024-08-12 NOTE — TELEPHONE ENCOUNTER
Requested Prescriptions     Pending Prescriptions Disp Refills    GLIPIZIDE 5 MG Oral Tab [Pharmacy Med Name: GLIPIZIDE 5MG TABLETS] 90 tablet 0     Sig: TAKE 1 TABLET BY MOUTH DAILY     HGBA1C:    Lab Results   Component Value Date    A1C 7.4 (A) 07/10/2024    A1C 7.1 (A) 02/14/2024    A1C 8.5 (H) 10/09/2023     (H) 10/09/2023     Your Appointments      Tuesday November 12, 2024 4:00 PM  Diabetes Pump follow up with MICHAEL Kelly  [facility] ([facility]) [address] [address] [phone]               Last Refill: 6-4-2024-TIARA    Last OFFICE VISIT:   7--CB

## 2024-09-16 DIAGNOSIS — I10 ESSENTIAL HYPERTENSION: Chronic | ICD-10-CM

## 2024-09-17 RX ORDER — AMLODIPINE BESYLATE 5 MG/1
5 TABLET ORAL DAILY
Qty: 90 TABLET | Refills: 0 | Status: SHIPPED | OUTPATIENT
Start: 2024-09-17

## 2024-09-17 NOTE — TELEPHONE ENCOUNTER
Protocol failed     Amlodipine 5mg     LOV: 4/22/24   RTC: none noted   Filled: 2/26/24 #90 1refill   Labs: 10/9/23   Future Appointments   Date Time Provider Department Center   11/12/2024  4:00 PM Romana Barrett APRN [facility]

## 2024-10-03 DIAGNOSIS — E11.65 TYPE 2 DIABETES MELLITUS WITH HYPERGLYCEMIA, WITHOUT LONG-TERM CURRENT USE OF INSULIN (HCC): ICD-10-CM

## 2024-10-03 RX ORDER — GLIPIZIDE 5 MG/1
TABLET ORAL
Qty: 90 TABLET | Refills: 0 | Status: SHIPPED | OUTPATIENT
Start: 2024-10-03

## 2024-10-03 RX ORDER — DULAGLUTIDE 0.75 MG/.5ML
0.75 INJECTION, SOLUTION SUBCUTANEOUS
Qty: 2 ML | Refills: 3 | Status: SHIPPED | OUTPATIENT
Start: 2024-10-03

## 2024-10-03 NOTE — TELEPHONE ENCOUNTER
Requested Prescriptions     Pending Prescriptions Disp Refills    TRULICITY 0.75 MG/0.5ML Subcutaneous Solution Pen-injector [Pharmacy Med Name: TRULICITY 0.75MG/0.5ML INJ (4 PENS)] 2 mL 3     Sig: ADMINISTER 0.75 MG UNDER THE SKIN 1 TIME A WEEK     Future Appointments   Date Time Provider Department Center   11/12/2024  4:00 PM Romana Barrett APRN [facility]     Last A1c value was 7.4% done 7/10/2024.  Refill 7/29/24 Raheel   LOV 7/10/24 Raheel

## 2024-10-03 NOTE — TELEPHONE ENCOUNTER
Requested Prescriptions     Pending Prescriptions Disp Refills    GLIPIZIDE 5 MG Oral Tab [Pharmacy Med Name: GLIPIZIDE 5MG TABLETS] 90 tablet 0     Sig: TAKE 1 TABLET BY MOUTH DAILY     Future Appointments   Date Time Provider Department Center   11/12/2024  4:00 PM Romana Barrett APRN [facility]     Your appointments       Date & Time Appointment Department ([place])    Nov 12, 2024 4:00 PM CST Diabetes Pump follow up with Romana Barrett APRN [facility] ([facility])              [facility]  [address]  [facility] [id number] [phone]          Last A1c value was 7.4% done 7/10/2024.  Last OV: 07/10/2024  Last refill: 08/12/2024

## 2024-10-22 DIAGNOSIS — R09.81 SINUS CONGESTION: ICD-10-CM

## 2024-10-22 RX ORDER — FLUTICASONE PROPIONATE 50 MCG
2 SPRAY, SUSPENSION (ML) NASAL DAILY
Qty: 16 G | Refills: 0 | Status: SHIPPED | OUTPATIENT
Start: 2024-10-22

## 2024-11-18 ENCOUNTER — OFFICE VISIT (OUTPATIENT)
Dept: INTERNAL MEDICINE CLINIC | Facility: CLINIC | Age: 54
End: 2024-11-18
Payer: MEDICAID

## 2024-11-18 ENCOUNTER — LAB ENCOUNTER (OUTPATIENT)
Dept: LAB | Age: 54
End: 2024-11-18
Attending: INTERNAL MEDICINE
Payer: MEDICAID

## 2024-11-18 ENCOUNTER — HOSPITAL ENCOUNTER (OUTPATIENT)
Dept: GENERAL RADIOLOGY | Age: 54
Discharge: HOME | End: 2024-11-18
Attending: INTERNAL MEDICINE
Payer: MEDICAID

## 2024-11-18 VITALS
SYSTOLIC BLOOD PRESSURE: 114 MMHG | OXYGEN SATURATION: 95 % | WEIGHT: 230.63 LBS | HEART RATE: 111 BPM | TEMPERATURE: 97 F | HEIGHT: 69 IN | BODY MASS INDEX: 34.16 KG/M2 | DIASTOLIC BLOOD PRESSURE: 70 MMHG

## 2024-11-18 DIAGNOSIS — Z00.00 ANNUAL PHYSICAL EXAM: ICD-10-CM

## 2024-11-18 DIAGNOSIS — M79.671 FOOT PAIN, RIGHT: Primary | ICD-10-CM

## 2024-11-18 DIAGNOSIS — M79.671 FOOT PAIN, RIGHT: ICD-10-CM

## 2024-11-18 DIAGNOSIS — E11.65 TYPE 2 DIABETES MELLITUS WITH HYPERGLYCEMIA, WITHOUT LONG-TERM CURRENT USE OF INSULIN (HCC): ICD-10-CM

## 2024-11-18 DIAGNOSIS — Z13.9 SCREENING DUE: ICD-10-CM

## 2024-11-18 LAB
ALBUMIN SERPL-MCNC: 4.6 G/DL (ref 3.2–4.8)
ALBUMIN/GLOB SERPL: 1.6 {RATIO} (ref 1–2)
ALP LIVER SERPL-CCNC: 97 U/L
ALT SERPL-CCNC: 28 U/L
ANION GAP SERPL CALC-SCNC: 7 MMOL/L (ref 0–18)
AST SERPL-CCNC: 24 U/L (ref ?–34)
BASOPHILS # BLD AUTO: 0.06 X10(3) UL (ref 0–0.2)
BASOPHILS NFR BLD AUTO: 0.8 %
BILIRUB SERPL-MCNC: 0.7 MG/DL (ref 0.3–1.2)
BUN BLD-MCNC: 17 MG/DL (ref 9–23)
CALCIUM BLD-MCNC: 10.2 MG/DL (ref 8.7–10.4)
CHLORIDE SERPL-SCNC: 105 MMOL/L (ref 98–112)
CHOLEST SERPL-MCNC: 166 MG/DL (ref ?–200)
CO2 SERPL-SCNC: 27 MMOL/L (ref 21–32)
CREAT BLD-MCNC: 1.11 MG/DL
EGFRCR SERPLBLD CKD-EPI 2021: 78 ML/MIN/1.73M2 (ref 60–?)
EOSINOPHIL # BLD AUTO: 0.38 X10(3) UL (ref 0–0.7)
EOSINOPHIL NFR BLD AUTO: 5.4 %
ERYTHROCYTE [DISTWIDTH] IN BLOOD BY AUTOMATED COUNT: 13.9 %
FASTING PATIENT LIPID ANSWER: NO
FASTING STATUS PATIENT QL REPORTED: NO
GLOBULIN PLAS-MCNC: 2.9 G/DL (ref 2–3.5)
GLUCOSE BLD-MCNC: 116 MG/DL (ref 70–99)
HCT VFR BLD AUTO: 48.9 %
HDLC SERPL-MCNC: 49 MG/DL (ref 40–59)
HEMOGLOBIN A1C: 7.6 % (ref 4.3–5.6)
HGB BLD-MCNC: 16.5 G/DL
IMM GRANULOCYTES # BLD AUTO: 0.02 X10(3) UL (ref 0–1)
IMM GRANULOCYTES NFR BLD: 0.3 %
LDLC SERPL CALC-MCNC: 94 MG/DL (ref ?–100)
LYMPHOCYTES # BLD AUTO: 1.85 X10(3) UL (ref 1–4)
LYMPHOCYTES NFR BLD AUTO: 26.1 %
MCH RBC QN AUTO: 29.1 PG (ref 26–34)
MCHC RBC AUTO-ENTMCNC: 33.7 G/DL (ref 31–37)
MCV RBC AUTO: 86.2 FL
MONOCYTES # BLD AUTO: 0.6 X10(3) UL (ref 0.1–1)
MONOCYTES NFR BLD AUTO: 8.5 %
NEUTROPHILS # BLD AUTO: 4.19 X10 (3) UL (ref 1.5–7.7)
NEUTROPHILS # BLD AUTO: 4.19 X10(3) UL (ref 1.5–7.7)
NEUTROPHILS NFR BLD AUTO: 58.9 %
NONHDLC SERPL-MCNC: 117 MG/DL (ref ?–130)
OSMOLALITY SERPL CALC.SUM OF ELEC: 291 MOSM/KG (ref 275–295)
PLATELET # BLD AUTO: 206 10(3)UL (ref 150–450)
POTASSIUM SERPL-SCNC: 4.8 MMOL/L (ref 3.5–5.1)
PROT SERPL-MCNC: 7.5 G/DL (ref 5.7–8.2)
RBC # BLD AUTO: 5.67 X10(6)UL
SODIUM SERPL-SCNC: 139 MMOL/L (ref 136–145)
T4 FREE SERPL-MCNC: 1.2 NG/DL (ref 0.8–1.7)
TRIGL SERPL-MCNC: 130 MG/DL (ref 30–149)
TSI SER-ACNC: 5.45 UIU/ML (ref 0.55–4.78)
URATE SERPL-MCNC: 4.6 MG/DL
VIT D+METAB SERPL-MCNC: 21.5 NG/ML (ref 30–100)
VLDLC SERPL CALC-MCNC: 21 MG/DL (ref 0–30)
WBC # BLD AUTO: 7.1 X10(3) UL (ref 4–11)

## 2024-11-18 PROCEDURE — 80053 COMPREHEN METABOLIC PANEL: CPT

## 2024-11-18 PROCEDURE — 82306 VITAMIN D 25 HYDROXY: CPT

## 2024-11-18 PROCEDURE — 80061 LIPID PANEL: CPT

## 2024-11-18 PROCEDURE — 85025 COMPLETE CBC W/AUTO DIFF WBC: CPT

## 2024-11-18 PROCEDURE — 84550 ASSAY OF BLOOD/URIC ACID: CPT

## 2024-11-18 PROCEDURE — 36415 COLL VENOUS BLD VENIPUNCTURE: CPT

## 2024-11-18 PROCEDURE — 84443 ASSAY THYROID STIM HORMONE: CPT

## 2024-11-18 PROCEDURE — 73630 X-RAY EXAM OF FOOT: CPT | Performed by: INTERNAL MEDICINE

## 2024-11-18 PROCEDURE — 84439 ASSAY OF FREE THYROXINE: CPT

## 2024-11-18 RX ORDER — PANCRELIPASE LIPASE, PANCRELIPASE PROTEASE, PANCRELIPASE AMYLASE 40000; 126000; 168000 [USP'U]/1; [USP'U]/1; [USP'U]/1
1 CAPSULE, DELAYED RELEASE ORAL 3 TIMES DAILY
Qty: 90 CAPSULE | Refills: 3 | Status: SHIPPED | OUTPATIENT
Start: 2024-11-18 | End: 2024-12-18

## 2024-11-18 NOTE — PROGRESS NOTES
Subjective:   Evangelina Hahn is a 53 year old male who presents for right  Foot Pain.  The patient has been experiencing the pain for last few weeks.  It especially happens after he walks a mile.  He also has pain in the right knee and right elbow.  The patient denies morning stiffness.    The patient has not got colonoscopy yet.    Patient complains of bloating feeling of upper abdomen and it occurred after the patient started taking Trulicity.  And the stools are pasty like.      History/Other:    Chief Complaint Reviewed and Verified  No Further Nursing Notes to   Review  Tobacco Reviewed  Allergies Reviewed  Medications Reviewed    Medical History Reviewed  Surgical History Reviewed  Family History   Reviewed  Social History Reviewed         Tobacco:  He has never smoked tobacco.    Current Outpatient Medications   Medication Sig Dispense Refill    FLUTICASONE PROPIONATE 50 MCG/ACT Nasal Suspension SHAKE LIQUID AND USE 2 SPRAYS IN EACH NOSTRIL DAILY 16 g 0    TRULICITY 0.75 MG/0.5ML Subcutaneous Solution Pen-injector ADMINISTER 0.75 MG UNDER THE SKIN 1 TIME A WEEK 2 mL 3    GLIPIZIDE 5 MG Oral Tab TAKE 1 TABLET BY MOUTH DAILY 90 tablet 0    amLODIPine 5 MG Oral Tab Take 1 tablet (5 mg total) by mouth daily. 90 tablet 0    FREESTYLE XIMENA 3 SENSOR Does not apply Misc USE AS DIRECTED AND CHANGE EVERY 14 DAYS 2 each 3    METFORMIN HCL 1000 MG Oral Tab TAKE 1 TABLET(1000 MG) BY MOUTH TWICE DAILY WITH MEALS 180 tablet 1    Cholecalciferol (VITAMIN D) 50 MCG (2000 UT) Oral Tab Take 2,000 Units by mouth daily. 90 tablet 3    empagliflozin (JARDIANCE) 10 MG Oral Tab Take 1 tablet (10 mg total) by mouth daily. 90 tablet 1    valsartan 160 MG Oral Tab Take 1 tablet (160 mg total) by mouth daily. 90 tablet 3    atorvastatin 20 MG Oral Tab Take 1 tablet (20 mg total) by mouth daily. 90 tablet 2    dorzolamide 2 % Ophthalmic Solution Place 1 drop into both eyes in the morning and 1 drop before bedtime.      Glucose  Blood (ACCU-CHEK GEORGETTE PLUS) In Vitro Strip TEST EVERY  strip 1    latanoprost 0.005 % Ophthalmic Solution Place 1 drop into both eyes nightly.      prednisoLONE 1 % Ophthalmic Suspension  (Patient not taking: Reported on 11/18/2024)      Ketorolac Tromethamine 0.4 % Ophthalmic Solution  (Patient not taking: Reported on 11/18/2024)      ASPIRIN LOW DOSE 81 MG Oral Tab EC TAKE 1 TABLET(81 MG) BY MOUTH DAILY (Patient not taking: Reported on 11/18/2024) 90 tablet 1    guaiFENesin-codeine (CHERATUSSIN AC) 100-10 MG/5ML Oral Solution Take 5 mL by mouth every 6 (six) hours as needed. (Patient not taking: Reported on 11/18/2024) 200 mL 0         Review of Systems:  Pertinent items are noted in HPI.  A comprehensive review of systems was negative.  No calluses or ulcers in the legs and feet.    Objective:   /70 (BP Location: Left arm, Patient Position: Sitting, Cuff Size: large)   Pulse 111   Temp 97.2 °F (36.2 °C) (Temporal)   Ht 5' 9\" (1.753 m)   Wt 230 lb 9.6 oz (104.6 kg)   SpO2 95%   BMI 34.05 kg/m²  Estimated body mass index is 34.05 kg/m² as calculated from the following:    Height as of this encounter: 5' 9\" (1.753 m).    Weight as of this encounter: 230 lb 9.6 oz (104.6 kg).  General appearance: alert, appears stated age, and cooperative  Eyes: Jet black pupil , post cataract removal appearance.  Ears: normal TM's and external ear canals both ears  Chest wall: no tenderness  Heart: S1, S2 normal, no murmur, click, rub or gallop, regular rate and rhythm  Abdomen: soft, non-tender; bowel sounds normal; no masses,  no organomegaly  Extremities: extremities normal, atraumatic, no cyanosis or edema  Tenderness at the base of right fifth metatarsal.  Skin: Skin color, texture, turgor normal. No rashes or lesions  Lymph nodes: Cervical, supraclavicular, and axillary nodes normal.    Assessment & Plan:     1.  Diabetes mellitus with A1c of 7.6.  Up-to-date with the eye exam.  Continue with metformin,  glipizide and Trulicity.  Losing weight will also have a salutary effect to the diabetes mellitus.    2.  Class I obesity:   Weight Loss Advice :  A) Eat plant based diet and avoid ultra processed and fast foods.  B) Your portions should be a dinner plate. 1/2 should be vegetables, 1/4 lean protein (chicken, fish, turkey. Tofu), and 1/4 can be carbohydrates.   C)  Your main drink should be water rather than soda or alcohol or sweet coffees  D). Please try to exercise ( brisk walking or jogging) at least 30 minutes five times/week; and do muscle strengthening exercises ( lifting the weight, doing push ups or yoga)  twice a week    E). It is very important to get 7-9 hours of sleep per night    3.  Right foot pain, cause unclear.  Rule out stress fracture.  X-ray of the foot is ordered.    4.  Multiple joint pains without morning stiffness  -Will do the uric acid level.  -The patient does not have any other features of connective tissue disease therefore I am not ordering LEO now.  However if pt remains symptomatic I would consider it in the future.    5.  Preventative medicine: Cologuard is ordered.      No follow-ups on file.    Luis White MD, 11/18/2024, 2:42 PM

## 2024-11-18 NOTE — PATIENT INSTRUCTIONS
Weight Loss Advice :  A) Eat plant based diet and avoid ultra processed and fast foods.  B) Your portions should be a dinner plate. 1/2 should be vegetables, 1/4 lean protein (chicken, fish, turkey. Tofu), and 1/4 can be carbohydrates.   C)  Your main drink should be water rather than soda or alcohol or sweet coffees  D). Please try to exercise ( brisk walking or jogging) at least 30 minutes five times/week; and do muscle strengthening exercises ( lifting the weight, doing push ups or yoga)  twice a week    E). It is very important to get 7-9 hours of sleep per night

## 2024-11-19 ENCOUNTER — TELEPHONE (OUTPATIENT)
Dept: INTERNAL MEDICINE CLINIC | Facility: CLINIC | Age: 54
End: 2024-11-19

## 2024-11-19 DIAGNOSIS — E55.9 VITAMIN D DEFICIENCY: Primary | ICD-10-CM

## 2024-11-19 RX ORDER — ERGOCALCIFEROL 1.25 MG/1
50000 CAPSULE, LIQUID FILLED ORAL WEEKLY
Qty: 4 CAPSULE | Refills: 0 | Status: SHIPPED | OUTPATIENT
Start: 2024-11-19 | End: 2024-12-19

## 2024-11-19 NOTE — TELEPHONE ENCOUNTER
I conveyed the blood test results.  Marginally elevated TSH but will check TSH level next 6 weeks.  I have asked the patient to arrange follow-up.    X-ray of the foot did not reveal any osseous abnormality.    I discussed about stretching exercises to help with the muscular pains.    The patient has some stiffness in the morning time but it lasts less than 10 minutes, but mostly less than 5 minutes.

## 2024-11-23 DIAGNOSIS — R09.81 SINUS CONGESTION: ICD-10-CM

## 2024-11-25 ENCOUNTER — TELEPHONE (OUTPATIENT)
Age: 54
End: 2024-11-25

## 2024-11-25 DIAGNOSIS — E11.65 TYPE 2 DIABETES MELLITUS WITH HYPERGLYCEMIA, WITHOUT LONG-TERM CURRENT USE OF INSULIN (HCC): ICD-10-CM

## 2024-11-25 RX ORDER — EMPAGLIFLOZIN 10 MG/1
10 TABLET, FILM COATED ORAL DAILY
Qty: 90 TABLET | Refills: 1 | Status: SHIPPED | OUTPATIENT
Start: 2024-11-25

## 2024-11-25 RX ORDER — FLUTICASONE PROPIONATE 50 MCG
2 SPRAY, SUSPENSION (ML) NASAL DAILY
Qty: 16 G | Refills: 0 | Status: SHIPPED | OUTPATIENT
Start: 2024-11-25

## 2024-11-25 RX ORDER — HYDROCHLOROTHIAZIDE 12.5 MG/1
1 CAPSULE ORAL
Qty: 2 EACH | Refills: 5 | Status: SHIPPED | OUTPATIENT
Start: 2024-11-25

## 2024-11-25 NOTE — TELEPHONE ENCOUNTER
Patient called in requesting switch to Dk 3+.    LOV: 07/2024  Future Appointments   Date Time Provider Department Center   3/19/2025  2:30 PM Romana Barrett APRN [facility]     Last A1c value was 7.6% done 11/18/2024.

## 2024-11-25 NOTE — TELEPHONE ENCOUNTER
Requested Prescriptions     Pending Prescriptions Disp Refills    JARDIANCE 10 MG Oral Tab [Pharmacy Med Name: JARDIANCE 10MG TABLETS] 90 tablet 1     Sig: TAKE 1 TABLET(10 MG) BY MOUTH DAILY     Future Appointments   Date Time Provider Department Center   3/19/2025  2:30 PM Romana Barrett APRN [facility]     Last A1c value was 7.6% done 11/18/2024.  Refill 2/14/24 C.B  LOV 7/10/24 Raheel

## 2024-11-28 DIAGNOSIS — R09.81 SINUS CONGESTION: ICD-10-CM

## 2024-11-29 RX ORDER — FLUTICASONE PROPIONATE 50 MCG
2 SPRAY, SUSPENSION (ML) NASAL DAILY
Qty: 16 G | Refills: 0 | Status: SHIPPED | OUTPATIENT
Start: 2024-11-29

## 2024-12-06 ENCOUNTER — TELEPHONE (OUTPATIENT)
Dept: INTERNAL MEDICINE CLINIC | Facility: CLINIC | Age: 54
End: 2024-12-06

## 2024-12-06 NOTE — TELEPHONE ENCOUNTER
Cologuard test for colon cancer screening is negative.  Rescreening of colon cancer in 3 years that is 2027.    Called the patient but it was not available.

## 2024-12-22 DIAGNOSIS — E55.9 VITAMIN D DEFICIENCY: ICD-10-CM

## 2024-12-22 DIAGNOSIS — I10 ESSENTIAL HYPERTENSION: Chronic | ICD-10-CM

## 2024-12-22 DIAGNOSIS — E11.65 TYPE 2 DIABETES MELLITUS WITH HYPERGLYCEMIA, WITHOUT LONG-TERM CURRENT USE OF INSULIN (HCC): ICD-10-CM

## 2024-12-23 RX ORDER — AMLODIPINE BESYLATE 5 MG/1
5 TABLET ORAL DAILY
Qty: 90 TABLET | Refills: 0 | Status: SHIPPED | OUTPATIENT
Start: 2024-12-23

## 2024-12-23 NOTE — TELEPHONE ENCOUNTER
LOV: 11/18/24   RTC: no follow ups on file   Filled: 11/19/24 #4   Labs: 11/18/24   Future Appointments   Date Time Provider Department Center   3/19/2025  2:30 PM Romana Barrett APRN [facility]

## 2024-12-23 NOTE — TELEPHONE ENCOUNTER
Name from pharmacy: AMLODIPINE BESYLATE 5MG TABLETS         Will file in chart as: AMLODIPINE 5 MG Oral Tab    Sig: TAKE 1 TABLET(5 MG) BY MOUTH DAILY    Disp: 90 tablet    Refills: 0 (Pharmacy requested: Not specified)    Start: 12/22/2024    Class: Normal    Non-formulary For: Essential hypertension    Last ordered: 3 months ago (9/17/2024) by Kortney Arizmendi MD    Last refill: 9/17/2024    Rx #: [id number]    Hypertension Medications Protocol Pgmczp2612/22/2024 05:16 PM   Protocol Details CMP or BMP in past 12 months    Last BP reading less than 140/90    In person appointment or virtual visit in the past 12 mos or appointment in next 3 mos    EGFRCR or GFRNAA > 50      To be filled at: [facility] #[id number] [facility] [address] [place] [address] AT [facility] [address] , [phone], [phone]     LOV:11/18/2024  RTC:n/a   Labs:11/18/2024  Last ozewvj4809/17/2024  Future Appointments   Date Time Provider Department Center   3/19/2025  2:30 PM Romana Barrett APRN [facility]

## 2024-12-23 NOTE — TELEPHONE ENCOUNTER
Requested Prescriptions     Pending Prescriptions Disp Refills    METFORMIN HCL 1000 MG Oral Tab [Pharmacy Med Name: METFORMIN 1000MG TABLETS] 180 tablet 1     Sig: TAKE 1 TABLET(1000 MG) BY MOUTH TWICE DAILY WITH MEALS     Future Appointments   Date Time Provider Department Center   3/19/2025  2:30 PM Romana Barrett APRN [facility]     Last A1c value was 7.6% done 11/18/2024.  Refill 6/21/24 Raheel   Lov 7/10/24 Raheel

## 2024-12-26 RX ORDER — ERGOCALCIFEROL 1.25 MG/1
50000 CAPSULE, LIQUID FILLED ORAL WEEKLY
Qty: 4 CAPSULE | Refills: 0 | Status: SHIPPED | OUTPATIENT
Start: 2024-12-26

## 2025-02-02 DIAGNOSIS — E55.9 VITAMIN D DEFICIENCY: ICD-10-CM

## 2025-02-03 RX ORDER — VALSARTAN 160 MG/1
160 TABLET ORAL DAILY
Qty: 90 TABLET | Refills: 3 | Status: SHIPPED | OUTPATIENT
Start: 2025-02-03

## 2025-02-03 RX ORDER — ERGOCALCIFEROL 1.25 MG/1
50000 CAPSULE, LIQUID FILLED ORAL WEEKLY
Qty: 4 CAPSULE | Refills: 0 | Status: SHIPPED | OUTPATIENT
Start: 2025-02-03

## 2025-02-03 NOTE — TELEPHONE ENCOUNTER
Name from pharmacy: VALSARTAN 160MG TABLETS         Will file in chart as: VALSARTAN 160 MG Oral Tab    Sig: TAKE 1 TABLET(160 MG) BY MOUTH DAILY    Disp: 90 tablet    Refills: 3 (Pharmacy requested: Not specified)    Start: 2/2/2025    Class: Normal    Non-formulary    Last ordered: 1 year ago (1/9/2024) by Tico Tapia MD    Last refill: 10/19/2024    Rx #: [id number]    Hypertension Medications Protocol Zqohci7002/02/2025 01:56 PM   Protocol Details CMP or BMP in past 12 months    Last BP reading less than 140/90    In person appointment or virtual visit in the past 12 mos or appointment in next 3 mos    EGFRCR or GFRNAA > 50    Medication is active on med list       Name from pharmacy: VITAMIN D2 50,000IU (ERGO) CAP RX         Will file in chart as: ERGOCALCIFEROL 1.25 MG (57124 UT) Oral Cap    Sig: TAKE 1 CAPSULE BY MOUTH 1 TIME A WEEK    Disp: 4 capsule    Refills: 0 (Pharmacy requested: Not specified)    Start: 2/2/2025    Class: Normal    For: Vitamin D deficiency    Last ordered: 1 month ago (12/26/2024) by Tico Tapia MD    Last refill: 12/26/2024    Rx #: [id number]       To be filled at: [facility][id number] [facility] [address] [facility] AT [place] [address], [phone], [phone]

## 2025-02-12 ENCOUNTER — TELEPHONE (OUTPATIENT)
Dept: INTERNAL MEDICINE CLINIC | Facility: CLINIC | Age: 55
End: 2025-02-12

## 2025-02-21 RX ORDER — GLIPIZIDE 5 MG/1
TABLET ORAL
Qty: 90 TABLET | Refills: 0 | Status: SHIPPED | OUTPATIENT
Start: 2025-02-21

## 2025-02-21 NOTE — TELEPHONE ENCOUNTER
Requested Prescriptions     Pending Prescriptions Disp Refills    GLIPIZIDE 5 MG Oral Tab [Pharmacy Med Name: GLIPIZIDE 5MG TABLETS] 90 tablet 0     Sig: TAKE 1 TABLET BY MOUTH DAILY     HGBA1C:    Lab Results   Component Value Date    A1C 7.6 (A) 11/18/2024    A1C 7.4 (A) 07/10/2024    A1C 7.1 (A) 02/14/2024     (H) 10/09/2023     Your Appointments      Wednesday March 19, 2025 2:30 PM  Diabetes Pump follow up with MICHAEL Kelly  [facility] [address] [address] [phone]               Last Office Visit:  7--CB    Last Refill:  10-3-2024-90 tabs with 0 refills-CB

## 2025-03-06 ENCOUNTER — HOSPITAL ENCOUNTER (OUTPATIENT)
Age: 55
Discharge: HOME | End: 2025-03-06
Attending: EMERGENCY MEDICINE
Payer: MEDICAID

## 2025-03-06 VITALS
SYSTOLIC BLOOD PRESSURE: 130 MMHG | DIASTOLIC BLOOD PRESSURE: 100 MMHG | HEART RATE: 111 BPM | TEMPERATURE: 103 F | OXYGEN SATURATION: 98 % | RESPIRATION RATE: 18 BRPM

## 2025-03-06 DIAGNOSIS — U07.1 COVID-19: Primary | ICD-10-CM

## 2025-03-06 LAB
POCT INFLUENZA A: NEGATIVE
POCT INFLUENZA B: NEGATIVE
S PYO AG THROAT QL IA.RAPID: NEGATIVE
SARS-COV-2 RNA RESP QL NAA+PROBE: DETECTED

## 2025-03-06 PROCEDURE — 99204 OFFICE O/P NEW MOD 45 MIN: CPT

## 2025-03-06 PROCEDURE — 99213 OFFICE O/P EST LOW 20 MIN: CPT

## 2025-03-06 PROCEDURE — 87651 STREP A DNA AMP PROBE: CPT | Performed by: EMERGENCY MEDICINE

## 2025-03-06 PROCEDURE — 87502 INFLUENZA DNA AMP PROBE: CPT | Performed by: EMERGENCY MEDICINE

## 2025-03-06 RX ORDER — ACETAMINOPHEN 500 MG
1000 TABLET ORAL ONCE
Status: COMPLETED | OUTPATIENT
Start: 2025-03-06 | End: 2025-03-06

## 2025-03-06 NOTE — DISCHARGE INSTRUCTIONS
You should self quarantine through and including next Tuesday.  From next Wednesday through and including Sunday, wear a mask at all times when you are around other individuals.    Tylenol or Advil for fever.

## 2025-03-06 NOTE — ED PROVIDER NOTES
Patient Seen in: [facility]      History   No chief complaint on file.    Stated Complaint: Fever    Subjective:   HPI      54-year-old male presents to the emergency department complaining of nasal congestion yesterday and fever with muscle aches today.  Scratchy throat.  Occasional cough but no chest pain or difficulty breathing, vomiting or diarrhea.    Objective:     No pertinent past medical history.            No pertinent past surgical history.              No pertinent social history.            Review of Systems    Positive for stated complaint: Fever  Other systems are as noted in HPI.  Constitutional and vital signs reviewed.      All other systems reviewed and negative except as noted above.    Physical Exam     ED Triage Vitals [03/06/25 1644]   BP (!) 130/100   Pulse 111   Resp 18   Temp (!) 102.6 °F (39.2 °C)   Temp src Oral   SpO2 98 %   O2 Device None (Room air)       Current Vitals:   Vital Signs  BP: (!) 130/100  Pulse: 111  Resp: 18  Temp: (!) 102.6 °F (39.2 °C)  Temp src: Oral    Oxygen Therapy  SpO2: 98 %  O2 Device: None (Room air)        Physical Exam     General Appearance: This is a middle-aged male lying on a gurney.  Vital signs were reviewed per nurses notes.  Temperature is 102.6.  Pulse oximetry is 98% on room air.  Blood pressure 130/100.  Heart rate is 110.  HEENT: Normocephalic/atraumatic.  Anicteric sclera.  Oral mucosa is moist.  Oropharynx is normal.  Neck: No adenopathy or thyromegaly.  No hoarseness or stridor.  Lungs are clear to auscultation.  Heart exam: Normal S1-S2 without extra sounds or murmurs.  Regular rate and rhythm.  Skin is dry without rashes or lesions.  Extremities are atraumatic.  Neuroexam: Awake, conversive and moving all 4 extremities well.  ED Course     Labs Reviewed   RAPID SARS-COV-2 BY PCR - Abnormal; Notable for the following components:       Result Value    Rapid SARS-CoV-2 by PCR Detected (*)     All other components within normal  limits   POCT FLU TEST - Normal    Narrative:     This assay is a rapid molecular in vitro test utilizing nucleic acid amplification of influenza A and B viral RNA.   RAPID STREP A - Normal            Exam findings and treatment plan were discussed.       MDM      #1.  COVID-19.  Oxygenation is adequate.  Because of diabetes, antiviral was prescribed.    Medical Decision Making      Disposition and Plan     Clinical Impression:  1. COVID-19         Disposition:  Discharge  3/6/2025  5:33 pm    Follow-up:  Tico Tapia MD  [address] [address] [phone]    Call   As needed          Medications Prescribed:  Discharge Medication List as of 3/6/2025  5:36 PM        START taking these medications    Details   molnupiravir 200 MG Oral capsule Take 800 mg by mouth every 12 (twelve) hours for 5 days., Normal, Disp-40 capsule, R-0                 Supplementary Documentation:

## 2025-03-09 ENCOUNTER — TELEPHONE (OUTPATIENT)
Dept: URGENT CARE | Age: 55
End: 2025-03-09

## 2025-03-09 NOTE — ED NOTES
Pt called he states molnupiravir is not available in any [facility] pharmacy around the area.  Sultana stephens aware authorized to change to paxlovid.    Called [facility] pharmacy , spoke to pharmacist RX changed paxlovid .

## 2025-03-17 RX ORDER — PANCRELIPASE LIPASE, PANCRELIPASE PROTEASE, PANCRELIPASE AMYLASE 40000; 126000; 168000 [USP'U]/1; [USP'U]/1; [USP'U]/1
1 CAPSULE, DELAYED RELEASE ORAL 3 TIMES DAILY
Qty: 360 CAPSULE | Refills: 0 | OUTPATIENT
Start: 2025-03-17

## 2025-03-17 RX ORDER — PANCRELIPASE LIPASE, PANCRELIPASE PROTEASE, PANCRELIPASE AMYLASE 40000; 126000; 168000 [USP'U]/1; [USP'U]/1; [USP'U]/1
1 CAPSULE, DELAYED RELEASE ORAL 3 TIMES DAILY
Qty: 360 CAPSULE | Refills: 0 | OUTPATIENT
Start: 2025-03-17 | End: 2025-04-16

## 2025-03-23 DIAGNOSIS — E55.9 VITAMIN D DEFICIENCY: ICD-10-CM

## 2025-03-24 RX ORDER — ERGOCALCIFEROL 1.25 MG/1
50000 CAPSULE, LIQUID FILLED ORAL WEEKLY
Qty: 4 CAPSULE | Refills: 0 | Status: SHIPPED | OUTPATIENT
Start: 2025-03-24

## 2025-03-24 NOTE — TELEPHONE ENCOUNTER
LOV: 11/18/24  RTC: no follow ups on file   Filled: 2/3/25 #4  Labs: 11/18/24   Future Appointments   Date Time Provider Department Center   4/30/2025  3:00 PM Romana Barrett APRN [facility]

## 2025-04-05 DIAGNOSIS — I10 ESSENTIAL HYPERTENSION: Chronic | ICD-10-CM

## 2025-04-07 RX ORDER — AMLODIPINE BESYLATE 5 MG/1
5 TABLET ORAL DAILY
Qty: 90 TABLET | Refills: 0 | Status: SHIPPED | OUTPATIENT
Start: 2025-04-07

## 2025-04-07 NOTE — TELEPHONE ENCOUNTER
Requesting    Name from pharmacy: AMLODIPINE BESYLATE 5MG TABLETS         Will file in chart as: AMLODIPINE 5 MG Oral Tab    Sig: TAKE 1 TABLET(5 MG) BY MOUTH DAILY    Disp: 90 tablet    Refills: 0 (Pharmacy requested: Not specified)    Start: 4/5/2025    Class: Normal    Non-formulary For: Essential hypertension    Last ordered: 3 months ago (12/23/2024) by Tico Tapia MD    Last refill: 12/31/2024    Rx #: [id number]    Hypertension Medications Protocol Wddyil0504/05/2025 04:43 PM   Protocol Details Last BP reading less than 140/90    CMP or BMP in past 12 months    In person appointment or virtual visit in the past 12 mos or appointment in next 3 mos    EGFRCR or GFRNAA > 50    Medication is active on med list        LOV: 11/18/2024  RTC: none noted   Last Relevant Labs: 11/18/2024  Filled: 12/23/2024 #90 with 0 refills    Future Appointments   Date Time Provider Department Center   4/30/2025  3:00 PM Romana Barrett APRN [facility]

## 2025-04-10 ENCOUNTER — TELEPHONE (OUTPATIENT)
Dept: INTERNAL MEDICINE CLINIC | Facility: CLINIC | Age: 55
End: 2025-04-10

## 2025-04-14 RX ORDER — GLIPIZIDE 5 MG/1
5 TABLET ORAL DAILY
Qty: 90 TABLET | Refills: 0 | Status: SHIPPED | OUTPATIENT
Start: 2025-04-14

## 2025-04-14 NOTE — TELEPHONE ENCOUNTER
Requested Prescriptions     Pending Prescriptions Disp Refills    GLIPIZIDE 5 MG Oral Tab [Pharmacy Med Name: GLIPIZIDE 5MG TABLETS] 90 tablet 0     Sig: TAKE 1 TABLET BY MOUTH DAILY     Future Appointments   Date Time Provider Department Center   4/30/2025  3:00 PM Romana Barrett APRN [facility]     Last A1c value was 7.6% done 11/18/2024.  Refill 02/21/25 Raheel   LOV 07/10/24 Raheel

## 2025-04-21 ENCOUNTER — TELEPHONE (OUTPATIENT)
Facility: CLINIC | Age: 55
End: 2025-04-21

## 2025-04-25 ENCOUNTER — TELEPHONE (OUTPATIENT)
Facility: CLINIC | Age: 55
End: 2025-04-25

## 2025-04-25 NOTE — TELEPHONE ENCOUNTER
Continuous Glucose Sensor (FREESTYLE XIMENA 3 PLUS SENSOR) Does not apply Misc 2 each 5 11/25/2024 --    Sig - Route: 1 each every 15 (fifteen) days. - Does not apply    Sent to pharmacy as: FreeStyle Ximena 3 Plus Sensor    E-Prescribing Status: Receipt confirmed by pharmacy (11/25/2024  3:28 PM CST)    Prior authorization: Approved

## 2025-04-28 DIAGNOSIS — E55.9 VITAMIN D DEFICIENCY: ICD-10-CM

## 2025-04-29 NOTE — TELEPHONE ENCOUNTER
Name from pharmacy: VITAMIN D2 50,000IU (ERGO) CAP RX         Will file in chart as: ERGOCALCIFEROL 1.25 MG (81188 UT) Oral Cap    Sig: TAKE 1 CAPSULE BY MOUTH 1 TIME A WEEK    Disp: 4 capsule    Refills: 0 (Pharmacy requested: Not specified)    Start: 4/28/2025    Class: Normal    Non-formulary For: Vitamin D deficiency    Last ordered: 1 month ago (3/24/2025) by Tico Tapia MD    Last refill: 3/25/2025    Rx #: [id number]       To be filled at: [facility] #[id number] [facility] [address] [facility] AT [facility] [address], [phone], [phone]

## 2025-04-30 ENCOUNTER — OFFICE VISIT (OUTPATIENT)
Dept: ENDOCRINOLOGY CLINIC | Facility: CLINIC | Age: 55
End: 2025-04-30
Payer: MEDICAID

## 2025-04-30 VITALS
WEIGHT: 231.63 LBS | SYSTOLIC BLOOD PRESSURE: 120 MMHG | DIASTOLIC BLOOD PRESSURE: 70 MMHG | BODY MASS INDEX: 34 KG/M2 | HEART RATE: 103 BPM | OXYGEN SATURATION: 98 % | RESPIRATION RATE: 18 BRPM

## 2025-04-30 DIAGNOSIS — E11.21 DIABETIC NEPHROPATHY ASSOCIATED WITH TYPE 2 DIABETES MELLITUS (HCC): Chronic | ICD-10-CM

## 2025-04-30 DIAGNOSIS — E78.00 HYPERCHOLESTEREMIA: Chronic | ICD-10-CM

## 2025-04-30 DIAGNOSIS — I10 ESSENTIAL HYPERTENSION: Chronic | ICD-10-CM

## 2025-04-30 DIAGNOSIS — E11.65 TYPE 2 DIABETES MELLITUS WITH HYPERGLYCEMIA, WITHOUT LONG-TERM CURRENT USE OF INSULIN (HCC): Primary | ICD-10-CM

## 2025-04-30 LAB
CREAT UR-SCNC: 64.7 MG/DL
GLUCOSE BLOOD: 164
HEMOGLOBIN A1C: 8.2 % (ref 4.3–5.6)
MICROALBUMIN UR-MCNC: 16.6 MG/DL
MICROALBUMIN/CREAT 24H UR-RTO: 256.6 UG/MG (ref ?–30)
TEST STRIP LOT #: NORMAL NUMERIC

## 2025-04-30 PROCEDURE — 82570 ASSAY OF URINE CREATININE: CPT | Performed by: NURSE PRACTITIONER

## 2025-04-30 PROCEDURE — 83036 HEMOGLOBIN GLYCOSYLATED A1C: CPT | Performed by: NURSE PRACTITIONER

## 2025-04-30 PROCEDURE — 82947 ASSAY GLUCOSE BLOOD QUANT: CPT | Performed by: NURSE PRACTITIONER

## 2025-04-30 PROCEDURE — 82043 UR ALBUMIN QUANTITATIVE: CPT | Performed by: NURSE PRACTITIONER

## 2025-04-30 PROCEDURE — 95251 CONT GLUC MNTR ANALYSIS I&R: CPT | Performed by: NURSE PRACTITIONER

## 2025-04-30 PROCEDURE — 99214 OFFICE O/P EST MOD 30 MIN: CPT | Performed by: NURSE PRACTITIONER

## 2025-04-30 RX ORDER — ERGOCALCIFEROL 1.25 MG/1
50000 CAPSULE, LIQUID FILLED ORAL WEEKLY
Qty: 4 CAPSULE | Refills: 0 | Status: SHIPPED | OUTPATIENT
Start: 2025-04-30

## 2025-04-30 RX ORDER — GLIPIZIDE 5 MG/1
TABLET ORAL
Qty: 180 TABLET | Refills: 1 | Status: SHIPPED | OUTPATIENT
Start: 2025-04-30

## 2025-04-30 NOTE — PATIENT INSTRUCTIONS
Your trends are up     I will see you back on 9- at 315 pm     A1C 8.2% (last A1C 7.6%)     Continue   Metformin 1000mg twice daily   jardiance 10mg once daily    Glipizide  5mg twice daily     Trulicity 0.75mg subcutaneous once weekly     Restart Dk sensor - use the coupon     My recommendation is to increase Trulicity but this may increase your stomach side effects     We may need to consider fast acting insulin - as needed when your trends are over 200    We can re visit this plan at follow up if trends are not less than 7- 7.5%       Fluctuations in blood sugars are best detected by testing your sugar with your meter.   In order for me to determine any patterns in your blood sugars, you will need to test your blood sugar 1- 2 times daily     It would be best to change up the times of day that you are testing your sugar.   Always test before breakfast (fasting) and then alternate testing blood sugar 1-2 hours after your meals.     American Diabetes Association: blood sugar targets:     Fasting blood sugar (before breakfast) Target:    (ideally less than 110)  2 hours after eating less than 180 (ideally less than  150 )     Call for blood sugars less than  75 or greater than  200 more than 2 times in a week     If you are wearing the sensor, please look at your trends/averages    Recommendations:   GMI (estimated A1C ) target under  7%     Time in range (healthy blood sugar targets) : goal is over 70%   less than 70 : goal is less than 4%   Over 180 : goal is less than 20 %   Over 250: goal is  less than 5%       Watch for low blood sugars: (less than 70 )    Treatment of Low Blood Glucose Action Plan  1. Check blood glucose to be sure that it is low. You cannot  always go by symptoms or how you feel. If in doubt, treat your low blood glucose anyway.  Rule of 15 :     2. Take 15 grams of carbohydrate (carb). Here are some choices:    4 oz. regular fruit juice  3-4 glucose tablets  6 oz. regular  soda   7-8 jelly beans    3. Recheck blood glucose after 10-15 minutes. If blood glucose is still low (less than 70 mg/dl) repeat the treatment (step 2).    4. If your next meal is more than one hour away, eat a small snack.    5. If you’re not sure what caused your low blood glucose, call your healthcare provider.    6. Always check your blood glucose before you drive       To treat a low, I recommend you carry with you easy, pre-portioned treatment for low blood sugars that are 15G of carbs:   - Children sized squeeze pouch applesauce (high fiber + carbs help prevent too high of a spike)  - Small children's sized juicebox- 15g carb --> 4oz juice box  - Glucose tablets from [employer], you can find them near diabetes supplies --> Note, you will need to eat 3-4 tablets to get to 15g of carbs  - Children sized fruit snack pack- look for one with 15 grams of total carbohydrate    AVOID complex carbs, or foods that contain fats along with carbs (like chocolate) can slow the absorption of glucose and should NOT be used to treat an emergency low

## 2025-04-30 NOTE — PROGRESS NOTES
Chief Complaint   Patient presents with    Diabetes     Follow up not using Dk due to cost- BG in office not fasting 164       Evangelina Hahn is a 54 year old presents today for diabetes management.   Primary care physician: Tico Tapia MD   Last appt with Diabetes center:         Today 's A1C 8.2   %  ( last A1C 7.6 %)  Had Covid 3-2025         Not using sensor due to cost (not applying coupon) past 4 weeks  Sensor reviewed from 4 weeks 2025 -see above   Time in Range 44%  14 d average glucose: 186 mg/dl   50% Blood sugar at 12MN 200-220mg/dl ( pre dinner 140 mg/dl)           Diabetes History:  Type 2 DM ~    Patient has not had hospitalizations for blood sugar issues and denies any history of pancreatitis    Previous DM therapies:  uvia : lack of effect, change in therapy    Current DM Regimen:  Glipizide 5m tab daily   Jardiance 10mg once daily   Metformin 1000mg twice daily   Trulicity 0.75mg subcutaneous once weekly (skips 1-2 x month)       HGBA1C:    Lab Results   Component Value Date    A1C 8.2 (A) 2025    A1C 7.6 (A) 2024    A1C 7.4 (A) 07/10/2024     (H) 10/09/2023       Lab Results   Component Value Date    CHOLEST 166 2024    CHOLEST 146 2024    TRIG 130 2024    TRIG 77 2024    HDL 49 2024    HDL 49 2024    LDL 94 2024    LDL 82 2024     Lab Results   Component Value Date    MICROALBCREA 287.1 (H) 10/09/2023    MICROALBCREA 350.6 (H) 10/05/2022      Lab Results   Component Value Date    CREATSERUM 1.11 2024    CREATSERUM 1.13 10/09/2023    EGFRCR 78 2024    EGFRCR 77 10/09/2023     Lab Results   Component Value Date    AST 24 2024    AST 14 (L) 10/09/2023    ALT 28 2024    ALT 34 10/09/2023       Lab Results   Component Value Date    TSH 5.448 (H) 2024    TSH 4.380 (H) 2024    T4F 1.2 2024           DM Complications:  Microvascular:   Neuropathy: no  Retinopathy:  yes  Nephropathy: yes    Macrovascular:  PVD: no  CAD: no  Stroke/CVA: no    Modifying factors:  Medication adherence  - no , see above   Recent steroids, illness or infections: (past 90 d)  yes, COVID     Allergies: Seasonal    Past Medical History:    Chronic right shoulder pain    Diabetes (HCC)    Esophageal reflux    Hyperlipidemia     Past Surgical History:   Procedure Laterality Date    Cataract Bilateral      Social History     Socioeconomic History    Marital status: Single   Tobacco Use    Smoking status: Never    Smokeless tobacco: Never   Vaping Use    Vaping status: Never Used   Substance and Sexual Activity    Alcohol use: No     Alcohol/week: 0.0 standard drinks of alcohol    Drug use: No   Other Topics Concern    Caffeine Concern Yes     Comment: 2 cups of tea daily    Exercise No     Family History   Problem Relation Age of Onset    Diabetes Father     Obesity Father     Diabetes Mother     Obesity Mother      Current Medication List:   Current Outpatient Medications   Medication Sig Dispense Refill    ERGOCALCIFEROL 1.25 MG (34262 UT) Oral Cap TAKE 1 CAPSULE BY MOUTH 1 TIME A WEEK 4 capsule 0    glipiZIDE 5 MG Oral Tab 1 tab twice daily 180 tablet 1    AMLODIPINE 5 MG Oral Tab TAKE 1 TABLET(5 MG) BY MOUTH DAILY 90 tablet 0    VALSARTAN 160 MG Oral Tab TAKE 1 TABLET(160 MG) BY MOUTH DAILY 90 tablet 3    METFORMIN HCL 1000 MG Oral Tab TAKE 1 TABLET(1000 MG) BY MOUTH TWICE DAILY WITH MEALS 180 tablet 1    JARDIANCE 10 MG Oral Tab TAKE 1 TABLET(10 MG) BY MOUTH DAILY 90 tablet 1    TRULICITY 0.75 MG/0.5ML Subcutaneous Solution Pen-injector ADMINISTER 0.75 MG UNDER THE SKIN 1 TIME A WEEK 2 mL 3    atorvastatin 20 MG Oral Tab Take 1 tablet (20 mg total) by mouth daily. 90 tablet 2    FLUTICASONE PROPIONATE 50 MCG/ACT Nasal Suspension SHAKE LIQUID AND USE 2 SPRAYS IN EACH NOSTRIL DAILY 16 g 0    Continuous Glucose Sensor (FREESTYLE XIMENA 3 PLUS SENSOR) Does not apply Misc 1 each every 15 (fifteen) days. 2  each 5    prednisoLONE 1 % Ophthalmic Suspension  (Patient not taking: Reported on 11/18/2024)      Ketorolac Tromethamine 0.4 % Ophthalmic Solution  (Patient not taking: Reported on 11/18/2024)      ASPIRIN LOW DOSE 81 MG Oral Tab EC TAKE 1 TABLET(81 MG) BY MOUTH DAILY (Patient not taking: Reported on 4/30/2025) 90 tablet 1    guaiFENesin-codeine (CHERATUSSIN AC) 100-10 MG/5ML Oral Solution Take 5 mL by mouth every 6 (six) hours as needed. (Patient not taking: Reported on 11/18/2024) 200 mL 0    dorzolamide 2 % Ophthalmic Solution Place 1 drop into both eyes in the morning and 1 drop before bedtime.      Glucose Blood (ACCU-CHEK GEORGETTE PLUS) In Vitro Strip TEST EVERY  strip 1    latanoprost 0.005 % Ophthalmic Solution Place 1 drop into both eyes nightly.         DM associated review of  symptoms:   Endocrine: Polyuria, polyphagia, polydipsia: no  Neurological: Paresthesias: no  HEENT: Blurred vision: no  Skin: No rash . No wounds  Hematological: Hypoglycemia: no     Review of Systems     LUNGS: denies shortness of breath   CARDIOVASCULAR: denies chest pain  GI: denies abdominal pain, nausea or diarrhea   : denies dysuria  MUSCULOSKELETAL: denies pain        Physical exam:  /70   Pulse 103   Resp 18   Wt 231 lb 9.6 oz (105.1 kg)   SpO2 98%   BMI 34.20 kg/m²   Body mass index is 34.2 kg/m².    Physical Exam     Constitutional: Normal appearance   Cardiac:  Normal rate  Pulmonary/Chest: Effort normal  Neurological: Alert and oriented .   Psychiatric: Normal mood and affect.       Assessment/Plan:  Nephropathy /Chronic Kidney Disease   Reminded patient impact glycemic trends have on kidney health   Lab Results   Component Value Date    EGFRCR 78 11/18/2024    MICROALBCREA 287.1 (H) 10/09/2023     Needs ongoing monitoring --> urine m/alb collected today   Current meds arb, farxiga  Insurance barrier w Ozempic          Hypertension   Well controlled but needs ongoing monitoring   CPM     Dyslipidemia    Cholesterol: 166, done on 11/18/2024.  HDL Cholesterol: 49, done on 11/18/2024.  TriGlycerides 130, done on 11/18/2024.  LDL Cholesterol: 94, done on 11/18/2024.      Needs ongoing monitoring --> ordered update   Continue atorvastatin      Obesity     Weight 231 lb ( last weight; :230 lb )   Continue lifestyle modications   Declines increase in GLP1 rx dose       Type  diabetes mellitus with hyperglycemia (HCC)  A1C:8.2% (last A1C 7.4%)     Weight 231 lb ( last weight : 230 lb)  Diabetes control is increased. Needs ongoing management and evaluation  given the chronicity of Diabetes, ongoing medication monitoring and risk for complications   Trends do better on CGM; cost barrier w insurance coverage.   Will resume   dk 3 +  cgm ( paying cash )   Again, discussed increasing Trulicity 0.75mg --> 1.5mg subcutaneous once weekly but declined dose increase (transient GI symptoms)     Increase Glipizide 5mg 1 tab daily --> 1 tab twice daily   Continue   Metformin 1000mg twice daily   jardiance 10mg once daily     Trulicity 0.75mg subcutaneous once weekly         Reminded of hydration importance w SGLT2i rx , sick day management     Reviewed  clinical signifiance of A1c, adverse effects of suboptimal glucose control, and goals of therapy discussed with pt in detail  Continue lifestyle modifications: diet, carb controlling, physical activity since positive impact on BG trends/health -    Reminded pt on A1C and blood sugar targets (Fasting < 130 and post prandial <180 ) and complications associated with hyperglycemia and uncontrolled DM  Reviewed hypoglycemia signs/symptoms, treatment using the Rule of 15' and when to call DM center .  Dk review in 4 weeks     Patient is asked to return in 3- 4 m . Also , recommended to contact DM clinic sooner if questions or concerns.    The patient indicates understanding of these issues and agrees to the plan.      Orders Placed This Encounter    Microalb/Creat Ratio, Random Urine      Standing Status:   Future     Number of Occurrences:   1     Expected Date:   2025     Expiration Date:   2026     Release to patient:   Immediate    POC Hemoglobin A1C     Release to patient:   Immediate    ASSAY QUANTITATIVE, GLUCOSE     Release to patient:   Immediate    Comp Metabolic Panel (14)     Standing Status:   Future     Expected Date:   2025     Expiration Date:   2026    Lipid Panel     Standing Status:   Future     Expected Date:   2025     Expiration Date:   2026    TSH W Reflex To Free T4     Standing Status:   Future     Expected Date:   2025     Expiration Date:   2026     Release to patient:   Immediate    Interpretation code 72 hour glucose monitor    glipiZIDE 5 MG Oral Tab     Si tab twice daily     Dispense:  180 tablet     Refill:  1       Diabetes complications & risks surveillance:   A1C/Blood pressure: as reported    Last dilated eye exam: Last Dilated Eye Exam: 25   Exam shows retinopathy? Eye Exam shows Diabetic Retinopathy?: Yes  Last diabetic foot exam: No data recorded  Nephropathy screening:    Continue ace /arb rx.    Lab Results   Component Value Date    EGFRCR 78 2024    MICROALBCREA 287.1 (H) 10/09/2023     LIPID screening:    Lab Results   Component Value Date    CHOLEST 166 2024    LDL 94 2024    TRIG 130 2024    HDL 49 2024    FASTING No 2024    FASTING No 2024     Cholesterol Lowering Medications            atorvastatin 20 MG Oral Tab           Defer foot exam to follow up appointment (denies foot wounds or sores)

## 2025-05-12 ENCOUNTER — OFFICE VISIT (OUTPATIENT)
Dept: INTERNAL MEDICINE CLINIC | Facility: CLINIC | Age: 55
End: 2025-05-12
Payer: MEDICAID

## 2025-05-12 VITALS
DIASTOLIC BLOOD PRESSURE: 86 MMHG | TEMPERATURE: 98 F | HEART RATE: 105 BPM | WEIGHT: 231.38 LBS | SYSTOLIC BLOOD PRESSURE: 140 MMHG | OXYGEN SATURATION: 98 % | BODY MASS INDEX: 33.12 KG/M2 | HEIGHT: 70 IN

## 2025-05-12 DIAGNOSIS — J34.2 DEVIATED NASAL SEPTUM: ICD-10-CM

## 2025-05-12 DIAGNOSIS — E11.21 DIABETIC NEPHROPATHY ASSOCIATED WITH TYPE 2 DIABETES MELLITUS (HCC): Primary | Chronic | ICD-10-CM

## 2025-05-12 PROCEDURE — 90471 IMMUNIZATION ADMIN: CPT | Performed by: INTERNAL MEDICINE

## 2025-05-12 PROCEDURE — 99214 OFFICE O/P EST MOD 30 MIN: CPT | Performed by: INTERNAL MEDICINE

## 2025-05-12 PROCEDURE — 90715 TDAP VACCINE 7 YRS/> IM: CPT | Performed by: INTERNAL MEDICINE

## 2025-05-12 RX ORDER — AMOXICILLIN AND CLAVULANATE POTASSIUM 500; 125 MG/1; MG/1
1 TABLET, FILM COATED ORAL 2 TIMES DAILY
Qty: 20 TABLET | Refills: 0 | Status: SHIPPED | OUTPATIENT
Start: 2025-05-12 | End: 2025-05-22

## 2025-05-12 NOTE — PROGRESS NOTES
Subjective:   Evangelina Hahn is a 54 year old male who presents for Physical   Patient has history of diabetes mellitus, essential hypertension, class I obesity and obstructive sleep apnea.      The patient has small boil in the right groin area.  Has applied Neosporin but did not help.    History/Other:    Chief Complaint Reviewed and Verified  No Further Nursing Notes to   Review  Tobacco Reviewed  Allergies Reviewed  Medications Reviewed    Problem List Reviewed  Medical History Reviewed  Surgical History   Reviewed  Family History Reviewed  Social History Reviewed         Tobacco:  He has never smoked tobacco.    Current Medications[1]      Review of Systems:  Pertinent items are noted in HPI.  A comprehensive review of systems was negative.      Objective:   Temp 97.7 °F (36.5 °C) (Temporal)   Ht 5' 10\" (1.778 m)   Wt 231 lb 6.4 oz (105 kg)   BMI 33.20 kg/m²  Estimated body mass index is 33.2 kg/m² as calculated from the following:    Height as of this encounter: 5' 10\" (1.778 m).    Weight as of this encounter: 231 lb 6.4 oz (105 kg).      Assessment & Plan:   1.  Diabetes mellitus with A1c of 8.2.  Slightly worsened from the last time.  Up-to-date with the eye exam.  Pt has retinopathy.     Continue with metformin, glipizide, jardiance  and Trulicity.      The patient has microalbumin creatinine ratio of 256, which is elevated.  Continue with valsartan.     2.  Class I obesity:   Wt Readings from Last 6 Encounters:   05/12/25 231 lb 6.4 oz (105 kg)   04/30/25 231 lb 9.6 oz (105.1 kg)   11/18/24 230 lb 9.6 oz (104.6 kg)   07/10/24 230 lb 6.4 oz (104.5 kg)   04/22/24 226 lb (102.5 kg)   02/22/24 229 lb (103.9 kg)     Weight Loss Advice :  A) Eat plant based diet and avoid ultra processed and fast foods.  B) Your portions should be a dinner plate. 1/2 should be vegetables, 1/4 lean protein (chicken, fish, turkey. Tofu), and 1/4 can be carbohydrates.   C)  Your main drink should be water rather than soda  or alcohol or sweet coffees  D). Please try to exercise ( brisk walking or jogging) at least 30 minutes five times/week; and do muscle strengthening exercises ( lifting the weight, doing push ups or yoga)  twice a week    E). It is very important to get 7-9 hours of sleep per night     3.  Right foot pain, cause unclear. Now better. Normal xray and uric acid- normal.    4. DIANA: Currenlty not in cpap    5.  Nasal congestion due to allergic rhinitis and pre-existing deviated nasal septum to right side.  Continue with Flonase.  Try Neti pot     6.  Preventative medicine:   Cologuard : negative  The patient will consider pneumococcal vaccine and zoster vaccine next time.    7.  Boil in RLQ of abdomen -prescribed amoxicillin/clavulanate.  Have used      No follow-ups on file.    Luis White MD, 5/12/2025, 3:18 PM       [1]   Current Outpatient Medications   Medication Sig Dispense Refill    VALSARTAN 160 MG Oral Tab TAKE 1 TABLET(160 MG) BY MOUTH DAILY 90 tablet 3    METFORMIN HCL 1000 MG Oral Tab TAKE 1 TABLET(1000 MG) BY MOUTH TWICE DAILY WITH MEALS 180 tablet 1    TRULICITY 0.75 MG/0.5ML Subcutaneous Solution Pen-injector ADMINISTER 0.75 MG UNDER THE SKIN 1 TIME A WEEK 2 mL 3    latanoprost 0.005 % Ophthalmic Solution Place 1 drop into both eyes nightly.      ERGOCALCIFEROL 1.25 MG (02932 UT) Oral Cap TAKE 1 CAPSULE BY MOUTH 1 TIME A WEEK 4 capsule 0    glipiZIDE 5 MG Oral Tab 1 tab twice daily 180 tablet 1    AMLODIPINE 5 MG Oral Tab TAKE 1 TABLET(5 MG) BY MOUTH DAILY 90 tablet 0    FLUTICASONE PROPIONATE 50 MCG/ACT Nasal Suspension SHAKE LIQUID AND USE 2 SPRAYS IN EACH NOSTRIL DAILY 16 g 0    JARDIANCE 10 MG Oral Tab TAKE 1 TABLET(10 MG) BY MOUTH DAILY 90 tablet 1    Continuous Glucose Sensor (FREESTYLE XIMENA 3 PLUS SENSOR) Does not apply Misc 1 each every 15 (fifteen) days. 2 each 5    prednisoLONE 1 % Ophthalmic Suspension  (Patient not taking: Reported on 5/12/2025)      Ketorolac Tromethamine 0.4 % Ophthalmic  Solution  (Patient not taking: Reported on 11/18/2024)      ASPIRIN LOW DOSE 81 MG Oral Tab EC TAKE 1 TABLET(81 MG) BY MOUTH DAILY (Patient not taking: Reported on 4/30/2025) 90 tablet 1    guaiFENesin-codeine (CHERATUSSIN AC) 100-10 MG/5ML Oral Solution Take 5 mL by mouth every 6 (six) hours as needed. (Patient not taking: Reported on 11/18/2024) 200 mL 0    atorvastatin 20 MG Oral Tab Take 1 tablet (20 mg total) by mouth daily. 90 tablet 2    dorzolamide 2 % Ophthalmic Solution Place 1 drop into both eyes in the morning and 1 drop before bedtime.      Glucose Blood (ACCU-CHEK GEORGETTE PLUS) In Vitro Strip TEST EVERY  strip 1

## 2025-05-12 NOTE — PATIENT INSTRUCTIONS
1.  Please use fluticasone nasal spray 2 puffs.  You have deviated nasal septum and I am referring to the ENT doctor.    2.  Weight Loss Advice :  A) Eat plant based diet and avoid ultra processed and fast foods.  B) Your portions should be a dinner plate. 1/2 should be vegetables, 1/4 lean protein (chicken, fish, turkey. Tofu), and 1/4 can be carbohydrates.   C)  Your main drink should be water rather than soda or alcohol or sweet coffees  D). Please try to exercise ( brisk walking or jogging) at least 30 minutes five times/week; and do muscle strengthening exercises ( lifting the weight, doing push ups or yoga)  twice a week    E). It is very important to get 7-9 hours of sleep per night    3.  You also have referral to podiatry.

## 2025-05-28 RX ORDER — PANCRELIPASE LIPASE, PANCRELIPASE PROTEASE, PANCRELIPASE AMYLASE 40000; 126000; 168000 [USP'U]/1; [USP'U]/1; [USP'U]/1
1 CAPSULE, DELAYED RELEASE ORAL 3 TIMES DAILY
Qty: 360 CAPSULE | Refills: 0 | Status: SHIPPED | OUTPATIENT
Start: 2025-05-28

## 2025-05-29 DIAGNOSIS — E11.65 TYPE 2 DIABETES MELLITUS WITH HYPERGLYCEMIA, WITHOUT LONG-TERM CURRENT USE OF INSULIN (HCC): ICD-10-CM

## 2025-05-29 DIAGNOSIS — E78.00 HYPERCHOLESTEREMIA: Chronic | ICD-10-CM

## 2025-05-29 RX ORDER — EMPAGLIFLOZIN 10 MG/1
10 TABLET, FILM COATED ORAL DAILY
Qty: 90 TABLET | Refills: 1 | Status: SHIPPED | OUTPATIENT
Start: 2025-05-29

## 2025-05-29 RX ORDER — ATORVASTATIN CALCIUM 20 MG/1
20 TABLET, FILM COATED ORAL DAILY
Qty: 90 TABLET | Refills: 2 | Status: SHIPPED | OUTPATIENT
Start: 2025-05-29

## 2025-05-29 NOTE — TELEPHONE ENCOUNTER
Requested Prescriptions     Pending Prescriptions Disp Refills    atorvastatin 20 MG Oral Tab 90 tablet 2     Sig: Take 1 tablet (20 mg total) by mouth daily.    empagliflozin (JARDIANCE) 10 MG Oral Tab 90 tablet 1     Sig: Take 1 tablet (10 mg total) by mouth daily.     Your appointments       Date & Time Appointment Department ([place])    Sep 17, 2025 3:15 PM CDT Diabetes Pump follow up with Romana Barrett APRN [facility] ([facility])              [facility]  [address]  [facility] [address] [phone]         Last A1c value was 8.2% done 4/30/2025.     Last office visit: 4/20/25 - CB  Last refill: 1/8/24 - CB

## 2025-05-29 NOTE — TELEPHONE ENCOUNTER
Requested Prescriptions     Pending Prescriptions Disp Refills    JARDIANCE 10 MG Oral Tab [Pharmacy Med Name: JARDIANCE 10MG TABLETS] 90 tablet 1     Sig: TAKE 1 TABLET(10 MG) BY MOUTH DAILY     Future Appointments   Date Time Provider Department Center   9/17/2025  3:15 PM Romana Barrett APRN [facility]     Last A1c value was 8.2% done 4/30/2025.  Jtukof22/25/24 Karthik VIZCARRA 04/30/25 Raheel

## 2025-06-23 DIAGNOSIS — E11.65 TYPE 2 DIABETES MELLITUS WITH HYPERGLYCEMIA, WITHOUT LONG-TERM CURRENT USE OF INSULIN (HCC): ICD-10-CM

## 2025-06-23 RX ORDER — GLIPIZIDE 5 MG/1
TABLET ORAL
Qty: 180 TABLET | Refills: 1 | Status: SHIPPED | OUTPATIENT
Start: 2025-06-23

## 2025-06-23 NOTE — TELEPHONE ENCOUNTER
Requested Prescriptions     Pending Prescriptions Disp Refills    glipiZIDE 5 MG Oral Tab 180 tablet 1     Si tab twice daily       Your appointments       Date & Time Appointment Department ([place])    Sep 17, 2025 3:15 PM CDT Diabetes Pump follow up with Romana Barrett APRN [facility] ([facility])              [facility]  [address]  [facility] [id number] [phone]           Last A1c value was 8.2% done 2025.   Last office visit: 3025 - CB  Last refill: 25 - CB

## 2025-07-07 DIAGNOSIS — I10 ESSENTIAL HYPERTENSION: Chronic | ICD-10-CM

## 2025-07-07 RX ORDER — AMLODIPINE BESYLATE 5 MG/1
5 TABLET ORAL DAILY
Qty: 90 TABLET | Refills: 0 | Status: SHIPPED | OUTPATIENT
Start: 2025-07-07

## 2025-07-07 NOTE — TELEPHONE ENCOUNTER
Requesting:  AMLODIPINE 5 MG Oral Tab     Sig: TAKE 1 TABLET(5 MG) BY MOUTH DAILY    Disp: 90 tablet    Refills: 0      Protocol: failed     LOV:05/12/2025  RTC:n/a   Labs:11/18/24  Last filled:04/07/2025  Future Appointments   Date Time Provider Department Center   9/17/2025  3:15 PM Romana Barrett APRN [facility]

## 2025-07-19 DIAGNOSIS — E11.65 TYPE 2 DIABETES MELLITUS WITH HYPERGLYCEMIA, WITHOUT LONG-TERM CURRENT USE OF INSULIN (HCC): ICD-10-CM

## 2025-07-21 NOTE — TELEPHONE ENCOUNTER
Requested Prescriptions     Pending Prescriptions Disp Refills    METFORMIN HCL 1000 MG Oral Tab [Pharmacy Med Name: METFORMIN 1000MG TABLETS] 180 tablet 1     Sig: TAKE 1 TABLET(1000 MG) BY MOUTH TWICE DAILY WITH MEALS     Future Appointments   Date Time Provider Department Center   9/17/2025  3:15 PM Romana Barrett APRN [facility]     Last A1c value was 8.2% done 4/30/2025.  Refill 12/23/24 Raheel   LOV 04/30/25 Raheel

## 2025-07-23 ENCOUNTER — TELEPHONE (OUTPATIENT)
Dept: INTERNAL MEDICINE CLINIC | Facility: CLINIC | Age: 55
End: 2025-07-23

## 2025-08-18 ENCOUNTER — TELEPHONE (OUTPATIENT)
Dept: INTERNAL MEDICINE CLINIC | Facility: CLINIC | Age: 55
End: 2025-08-18

## 2025-08-25 RX ORDER — HYDROCHLOROTHIAZIDE 12.5 MG/1
1 CAPSULE ORAL
Qty: 2 EACH | Refills: 5 | Status: SHIPPED | OUTPATIENT
Start: 2025-08-25

## (undated) DIAGNOSIS — E11.21 DIABETIC NEPHROPATHY ASSOCIATED WITH TYPE 2 DIABETES MELLITUS (HCC): ICD-10-CM

## (undated) DIAGNOSIS — I10 ESSENTIAL HYPERTENSION: Primary | ICD-10-CM

## (undated) DIAGNOSIS — E78.00 HYPERCHOLESTEREMIA: ICD-10-CM

## (undated) NOTE — LETTER
02/06/20        [facility]  [address] [facility]  [address]      Dear Margaret Dumont,    [address] records indicate that you have outstanding lab work and or testing that was ordered for you and has not yet been completed:      CT CALCIUM SCORING - Please cont

## (undated) NOTE — MR AVS SNAPSHOT
[place]  [address]  [address] [facility] [id number] [phone]               Thank you for choosing us for your health care visit with Cristal Finn MD.  We are glad to serve you and happy to provide you with this kahn ASPIRIN EC LOW DOSE 81 MG Tbec   Generic drug:  aspirin   TAKE 1 TABLET BY MOUTH DAILY           Atorvastatin Calcium 20 MG Tabs   Hold for 4 weeks   What changed:    - how much to take  - how to take this  - when to take this  - additional instructions Summaries. If you've been to the Emergency Department or your doctor's office, you can view your past visit information in [employer] by going to Visits < Visit Summaries. [employer] questions? Call [phone] for help.   [employer] is NOT to be used for urge

## (undated) NOTE — MR AVS SNAPSHOT
[place]  [address] [facility] [address] [address]  [address] [facility] [address] [phone]               Thank you for choosing us for your health care visit with Regina Barba MD.  We are glad to serve you and happy to provide you with this kahn glipiZIDE 5 MG Tabs   TAKE 1 AND 1/2 TABLETS(7.5 MG) BY MOUTH TWICE DAILY BEFORE MEALS   Commonly known as:  GLUCOTROL           latanoprost 0.005 % Soln   INSTILL 1 GTT  INTO EACH EYE QHS   Commonly known as:  XALATAN           losartan 100 MG Tabs   Sukhdeep

## (undated) NOTE — LETTER
10/27/21        Brodie Dan  [address] [place] [address]      Dear Steven Hooks records indicate that you have outstanding lab work and or testing that was ordered for you and has not yet been completed:  Orders Placed This Encounter      CT

## (undated) NOTE — LETTER
10/14/20        Shagufta Ibarra  [address]      Dear Christen Whittaker,    [address] [facility] records indicate that you have outstanding lab work and or testing that was ordered for you and has not yet been completed:  Orders Placed This Encounter      TS

## (undated) NOTE — MR AVS SNAPSHOT
[place]  [address] [facility] [facility] [address]  [address] [facility] [address] [phone]               Thank you for choosing us for your health care visit with Paty Crespo MD.  We are glad to serve you and happy to provide you with this kahn If you are confident that your benefit plan will not require a referral or authorization such as [place] Medicaid or HMO plans, you may schedule your appointment immediately.  However, if you are unsure about the requirements for your authorization, please weight loss program. If you haven't talked with a dietitian yet, ask your provider for a referral. The following guidelines can help you succeed:  · Select foods from the 6 food groups below.  Your dietitian will help you find food choices within each group © 0374-1420 [address], [address] [place]. All rights reserved. This information is not intended as a substitute for professional medical care. Always follow your healthcare professional's instructions.         Diet: D · Eat less fat to help lower your risk of heart disease. Use nonfat or low-fat dairy products and lean meats. Avoid fried foods. Use cooking oils that are unsaturated, such as olive, canola, or peanut oil.   · Talk with your dietitian about safe sugar subst Test blood sugar 2 times a day E11.65   Commonly known as:  ACCU-CHEK GEORGETTE PLUS           latanoprost 0.005 % Soln   INSTILL 1 GTT  INTO EACH EYE QHS   Commonly known as:  XALATAN           losartan 100 MG Tabs   Take 1 tablet (100 mg total) by mouth nissa including white bread, rice and pasta   Eat plenty of protein, keep the fat content low Sugars:  sodas and sports drinks, candies and desserts   Eat plenty of low-fat dairy products High fat meats and dairy   Choose whole grain products Foods high in sodiu

## (undated) NOTE — LETTER
03/22/18        Ted Riggsmin  [address] [facility]  [address]      Dear Chloé Doctor,    [address] [facility] records indicate that you have outstanding lab work and or testing that was ordered for you and has not yet been completed:          Hemoglobin A1C      Lipid Pa